# Patient Record
Sex: FEMALE | Race: WHITE | NOT HISPANIC OR LATINO | Employment: UNEMPLOYED | URBAN - METROPOLITAN AREA
[De-identification: names, ages, dates, MRNs, and addresses within clinical notes are randomized per-mention and may not be internally consistent; named-entity substitution may affect disease eponyms.]

---

## 2020-10-29 ENCOUNTER — TELEPHONE (OUTPATIENT)
Dept: CARDIOLOGY | Facility: MEDICAL CENTER | Age: 55
End: 2020-10-29

## 2020-10-29 NOTE — TELEPHONE ENCOUNTER
Called pt to see if she has seen a previous cardiologist in the past, she has not.     She has stated that she has only been to [facility] and that's where all of her records are as well.

## 2020-11-17 DIAGNOSIS — I48.91 ATRIAL FIBRILLATION, UNSPECIFIED TYPE (HCC): ICD-10-CM

## 2020-11-17 DIAGNOSIS — R00.2 PALPITATIONS: ICD-10-CM

## 2022-10-26 ENCOUNTER — APPOINTMENT (OUTPATIENT)
Dept: SLEEP MEDICINE | Facility: MEDICAL CENTER | Age: 57
End: 2022-10-26
Payer: MEDICAID

## 2022-11-08 ENCOUNTER — APPOINTMENT (OUTPATIENT)
Dept: SLEEP MEDICINE | Facility: MEDICAL CENTER | Age: 57
End: 2022-11-08
Payer: MEDICAID

## 2022-12-19 ENCOUNTER — OFFICE VISIT (OUTPATIENT)
Dept: SLEEP MEDICINE | Facility: MEDICAL CENTER | Age: 57
End: 2022-12-19
Payer: MEDICAID

## 2022-12-19 VITALS
RESPIRATION RATE: 18 BRPM | HEIGHT: 62 IN | WEIGHT: 242 LBS | SYSTOLIC BLOOD PRESSURE: 116 MMHG | DIASTOLIC BLOOD PRESSURE: 68 MMHG | BODY MASS INDEX: 44.53 KG/M2 | HEART RATE: 96 BPM | OXYGEN SATURATION: 94 %

## 2022-12-19 DIAGNOSIS — J44.89 COPD WITH ASTHMA (HCC): ICD-10-CM

## 2022-12-19 DIAGNOSIS — Z87.891 FORMER SMOKER: ICD-10-CM

## 2022-12-19 DIAGNOSIS — K51.919 ULCERATIVE COLITIS WITH COMPLICATION, UNSPECIFIED LOCATION (HCC): ICD-10-CM

## 2022-12-19 PROCEDURE — 99204 OFFICE O/P NEW MOD 45 MIN: CPT | Performed by: INTERNAL MEDICINE

## 2022-12-19 RX ORDER — BUDESONIDE AND FORMOTEROL FUMARATE DIHYDRATE 160; 4.5 UG/1; UG/1
AEROSOL RESPIRATORY (INHALATION)
COMMUNITY
Start: 2022-12-01 | End: 2023-01-16

## 2022-12-19 RX ORDER — NALTREXONE HYDROCHLORIDE 50 MG/1
50 TABLET, FILM COATED ORAL
COMMUNITY
Start: 2022-12-01 | End: 2023-03-22

## 2022-12-19 RX ORDER — MONTELUKAST SODIUM 10 MG/1
10 TABLET ORAL EVERY EVENING
Qty: 30 TABLET | Refills: 11 | Status: CANCELLED | OUTPATIENT
Start: 2022-12-19

## 2022-12-19 RX ORDER — TIOTROPIUM BROMIDE 18 UG/1
CAPSULE ORAL; RESPIRATORY (INHALATION)
COMMUNITY
Start: 2022-12-01 | End: 2023-01-16

## 2022-12-19 RX ORDER — ALBUTEROL SULFATE 90 UG/1
AEROSOL, METERED RESPIRATORY (INHALATION)
COMMUNITY
Start: 2022-12-01 | End: 2023-09-28 | Stop reason: SDUPTHER

## 2022-12-19 RX ORDER — FLUTICASONE FUROATE, UMECLIDINIUM BROMIDE AND VILANTEROL TRIFENATATE 200; 62.5; 25 UG/1; UG/1; UG/1
1 POWDER RESPIRATORY (INHALATION) DAILY
Qty: 2 EACH | Refills: 0 | COMMUNITY
Start: 2022-12-19 | End: 2023-01-15 | Stop reason: SDUPTHER

## 2022-12-19 RX ORDER — MONTELUKAST SODIUM 10 MG/1
10 TABLET ORAL EVERY EVENING
Qty: 30 TABLET | Refills: 11 | Status: SHIPPED | OUTPATIENT
Start: 2022-12-19 | End: 2023-12-12

## 2022-12-19 ASSESSMENT — ENCOUNTER SYMPTOMS
STRIDOR: 0
EYE REDNESS: 0
BACK PAIN: 0
PALPITATIONS: 0
WHEEZING: 1
DIZZINESS: 0
EYE PAIN: 0
PND: 0
EYE DISCHARGE: 0
SPEECH CHANGE: 0
NAUSEA: 0
WEIGHT LOSS: 0
ORTHOPNEA: 0
SHORTNESS OF BREATH: 1
DIARRHEA: 0
MYALGIAS: 0
TREMORS: 0
FOCAL WEAKNESS: 0
BLURRED VISION: 0
CHILLS: 0
HEARTBURN: 0
DOUBLE VISION: 0
FEVER: 0
CONSTIPATION: 0
SPUTUM PRODUCTION: 0
SINUS PAIN: 0
CLAUDICATION: 0
VOMITING: 0
HEMOPTYSIS: 0
HEADACHES: 0
NECK PAIN: 0
PHOTOPHOBIA: 0
DEPRESSION: 0
ABDOMINAL PAIN: 0
COUGH: 0
SORE THROAT: 0
DIAPHORESIS: 0
WEAKNESS: 0
FALLS: 0

## 2022-12-19 NOTE — PROGRESS NOTES
Chief Complaint   Patient presents with    Establish Care     Referral Guillermo Ramey Do DX COPD (chronic obstructive pulmonary disease) (HCC)       HPI: This patient is a 57 y.o. female presenting for evaluation of COPD-asthma overlap. PMHx is significant for UC not currently on any therapy and hx of bowel perforation, asthma in her 22 with environmental allergies reportedly worse in [place] than [place]. She is a former smoker with 18-20 pk year hx and quit 6 years ago. She has a hx of methamphetamine use but not for the past 6 years.  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 There is a family hx of asthma but no autoimmune dz or IBD. She was dx with COPD roughly 5 years ago and has been maintained on symbicort 160 and spiriva since that time. She was hospitalized this summer in [place] for acute exacerbation c/b acute hypoxic respiratory failure. She has weaned off O2 but sxs remain poorly controlled in that she is wheezing during our visit today and uses her albuterol 3-4 x daily.  She did not tolerate nebulized Tirso which seemed to worsen her sxs acutely at the time of her hospital stay. She is functionally MMRC class III SOB with minimal activity. Other triggers include strong perfume odors. No pets at home. No recent PFTs. CXR from 2022 read as normal/clear.     History reviewed. No pertinent past medical history.    Social History     Socioeconomic History    Marital status: Single     Spouse name: Not on file    Number of children: Not on file    Years of education: Not on file    Highest education level: Not on file   Occupational History    Not on file   Tobacco Use    Smoking status: Former     Packs/day: 0.50     Years: 37.00     Pack years: 18.50     Types: Cigarettes     Quit date:      Years since quittin.9     Passive exposure: Past    Smokeless tobacco: Never   Vaping Use    Vaping Use: Never used   Substance and Sexual Activity     Alcohol use: Yes     Alcohol/week: 3.6 oz     Types: 6 Standard drinks or equivalent per week    Drug use: Not Currently     Types: Methamphetamines    Sexual activity: Not on file   Other Topics Concern    Not on file   Social History Narrative    Not on file     Social Determinants of Health     Financial Resource Strain: Not on file   Food Insecurity: Not on file   Transportation Needs: Not on file   Physical Activity: Not on file   Stress: Not on file   Social Connections: Not on file   Intimate Partner Violence: Not on file   Housing Stability: Not on file       No family history on file.    Current Outpatient Medications on File Prior to Visit   Medication Sig Dispense Refill    VENTOLIN  (90 Base) MCG/ACT Aero Soln inhalation aerosol INHALE 1 PUFF BY MOUTH EVERY 4 HOURS AS NEEDED FOR WHEEZING      SYMBICORT 160-4.5 MCG/ACT Aerosol USE 2 PUFFS BY MOUTH TWO TIMES A DAY      SPIRIVA HANDIHALER 18 MCG Cap INHALE 1 CAPSULE BY MOUTH EVERY DAY *USE TWO INHALATIONS OF ONE CAPSULE FOR EACH DOSE*      naltrexone (DEPADE) 50 MG Tab Take 50 mg by mouth every day.       No current facility-administered medications on file prior to visit.       Allergies: Aspirin, Doxycycline, and Duloxetine    ROS:   Review of Systems   Constitutional:  Negative for chills, diaphoresis, fever, malaise/fatigue and weight loss.   HENT:  Negative for congestion, ear discharge, ear pain, hearing loss, nosebleeds, sinus pain, sore throat and tinnitus.    Eyes:  Negative for blurred vision, double vision, photophobia, pain, discharge and redness.   Respiratory:  Positive for shortness of breath and wheezing. Negative for cough, hemoptysis, sputum production and stridor.    Cardiovascular:  Negative for chest pain, palpitations, orthopnea, claudication, leg swelling and PND.   Gastrointestinal:  Negative for abdominal pain, constipation, diarrhea, heartburn, nausea and vomiting.   Genitourinary:  Negative for dysuria and urgency.  "  Musculoskeletal:  Negative for back pain, falls, joint pain, myalgias and neck pain.   Skin:  Negative for itching and rash.   Neurological:  Negative for dizziness, tremors, speech change, focal weakness, weakness and headaches.   Endo/Heme/Allergies:  Negative for environmental allergies.   Psychiatric/Behavioral:  Negative for depression.      /68 (BP Location: Right arm, Patient Position: Sitting, BP Cuff Size: Large adult)   Pulse 96   Resp 18   Ht 1.575 m (5' 2\")   Wt 110 kg (242 lb)   SpO2 94%     Physical Exam:  Physical Exam  Constitutional:       General: She is not in acute distress.     Appearance: Normal appearance. She is well-developed and normal weight.   HENT:      Head: Normocephalic and atraumatic.      Right Ear: External ear normal.      Left Ear: External ear normal.      Nose: Nose normal. No congestion.      Mouth/Throat:      Mouth: Mucous membranes are moist.      Pharynx: Oropharynx is clear. No oropharyngeal exudate.   Eyes:      General: No scleral icterus.     Extraocular Movements: Extraocular movements intact.      Conjunctiva/sclera: Conjunctivae normal.      Pupils: Pupils are equal, round, and reactive to light.   Neck:      Vascular: No JVD.      Trachea: No tracheal deviation.   Cardiovascular:      Rate and Rhythm: Normal rate and regular rhythm.      Heart sounds: Normal heart sounds. No murmur heard.    No friction rub. No gallop.   Pulmonary:      Effort: Pulmonary effort is normal. No accessory muscle usage or respiratory distress.      Breath sounds: Normal breath sounds. No wheezing or rales.   Abdominal:      General: There is no distension.      Palpations: Abdomen is soft.      Tenderness: There is no abdominal tenderness.   Musculoskeletal:         General: No tenderness or deformity. Normal range of motion.      Cervical back: Normal range of motion and neck supple.      Right lower leg: No edema.      Left lower leg: No edema.   Lymphadenopathy:      " Cervical: No cervical adenopathy.   Skin:     General: Skin is warm and dry.      Findings: No rash.      Nails: There is no clubbing.   Neurological:      Mental Status: She is alert and oriented to person, place, and time.      Cranial Nerves: No cranial nerve deficit.      Gait: Gait normal.   Psychiatric:         Behavior: Behavior normal.       PFTs as reviewed by me personally: none    Imaging as reviewed by me personally:as per hPI    Assessment:  1. COPD with asthma ([facility])  CBC WITH DIFFERENTIAL    PULMONARY FUNCTION TESTS -Test requested: Complete Pulmonary Function Test    IGE SERUM      2. Ulcerative colitis with complication, unspecified location (HCC)  Referral to Gastroenterology      3. Former smoker            Plan:  Pt appears to have more of an asthma phenotype and I would like to add montelukast and exchange her current regimen to trelegy 200 to see if she gets better medication administration. Continue prn Tirso via MDI, obtain full PFTs, serum IgE and cbc with differential to evaluate for eos. She is tobacco free and up to date on vaccine  Pt may have pulmonary issues related to UC and currently has intermittent UC flairs; no GI f/u therefore will refer to GI  Tobacco free. Medicaid does not cover lung [place] screening  Return in about 4 months (around 4/19/2023) for PFT SAME DAY AS FOLLOW UP; LABS .

## 2022-12-20 ENCOUNTER — HOSPITAL ENCOUNTER (OUTPATIENT)
Dept: LAB | Facility: MEDICAL CENTER | Age: 57
End: 2022-12-20
Attending: INTERNAL MEDICINE
Payer: MEDICAID

## 2022-12-20 DIAGNOSIS — J44.89 COPD WITH ASTHMA (HCC): ICD-10-CM

## 2022-12-20 LAB
BASOPHILS # BLD AUTO: 0.8 % (ref 0–1.8)
BASOPHILS # BLD: 0.07 K/UL (ref 0–0.12)
EOSINOPHIL # BLD AUTO: 0.28 K/UL (ref 0–0.51)
EOSINOPHIL NFR BLD: 3.3 % (ref 0–6.9)
ERYTHROCYTE [DISTWIDTH] IN BLOOD BY AUTOMATED COUNT: 42.8 FL (ref 35.9–50)
HCT VFR BLD AUTO: 39.2 % (ref 37–47)
HGB BLD-MCNC: 12.6 G/DL (ref 12–16)
IMM GRANULOCYTES # BLD AUTO: 0.05 K/UL (ref 0–0.11)
IMM GRANULOCYTES NFR BLD AUTO: 0.6 % (ref 0–0.9)
LYMPHOCYTES # BLD AUTO: 2.45 K/UL (ref 1–4.8)
LYMPHOCYTES NFR BLD: 29.1 % (ref 22–41)
MCH RBC QN AUTO: 30.4 PG (ref 27–33)
MCHC RBC AUTO-ENTMCNC: 32.1 G/DL (ref 33.6–35)
MCV RBC AUTO: 94.5 FL (ref 81.4–97.8)
MONOCYTES # BLD AUTO: 0.76 K/UL (ref 0–0.85)
MONOCYTES NFR BLD AUTO: 9 % (ref 0–13.4)
NEUTROPHILS # BLD AUTO: 4.8 K/UL (ref 2–7.15)
NEUTROPHILS NFR BLD: 57.2 % (ref 44–72)
NRBC # BLD AUTO: 0 K/UL
NRBC BLD-RTO: 0 /100 WBC
PLATELET # BLD AUTO: 382 K/UL (ref 164–446)
PMV BLD AUTO: 9.6 FL (ref 9–12.9)
RBC # BLD AUTO: 4.15 M/UL (ref 4.2–5.4)
WBC # BLD AUTO: 8.4 K/UL (ref 4.8–10.8)

## 2022-12-20 PROCEDURE — 85025 COMPLETE CBC W/AUTO DIFF WBC: CPT

## 2022-12-20 PROCEDURE — 82785 ASSAY OF IGE: CPT

## 2022-12-20 PROCEDURE — 36415 COLL VENOUS BLD VENIPUNCTURE: CPT

## 2022-12-22 LAB — IGE SERPL-ACNC: 6 KU/L

## 2023-01-18 ENCOUNTER — TELEPHONE (OUTPATIENT)
Dept: SLEEP MEDICINE | Facility: MEDICAL CENTER | Age: 58
End: 2023-01-18
Payer: COMMERCIAL

## 2023-03-07 ENCOUNTER — APPOINTMENT (OUTPATIENT)
Dept: SLEEP MEDICINE | Facility: MEDICAL CENTER | Age: 58
End: 2023-03-07
Payer: COMMERCIAL

## 2023-03-21 ASSESSMENT — ENCOUNTER SYMPTOMS: SLEEP DISTURBANCE: 0

## 2023-03-22 ENCOUNTER — OFFICE VISIT (OUTPATIENT)
Dept: SLEEP MEDICINE | Facility: MEDICAL CENTER | Age: 58
End: 2023-03-22
Attending: STUDENT IN AN ORGANIZED HEALTH CARE EDUCATION/TRAINING PROGRAM
Payer: MEDICAID

## 2023-03-22 VITALS
HEIGHT: 63 IN | WEIGHT: 230 LBS | DIASTOLIC BLOOD PRESSURE: 66 MMHG | BODY MASS INDEX: 40.75 KG/M2 | RESPIRATION RATE: 14 BRPM | HEART RATE: 102 BPM | OXYGEN SATURATION: 95 % | SYSTOLIC BLOOD PRESSURE: 108 MMHG

## 2023-03-22 DIAGNOSIS — G47.30 SLEEP DISORDER BREATHING: Primary | ICD-10-CM

## 2023-03-22 DIAGNOSIS — E66.01 CLASS 3 SEVERE OBESITY DUE TO EXCESS CALORIES WITH SERIOUS COMORBIDITY AND BODY MASS INDEX (BMI) OF 40.0 TO 44.9 IN ADULT (HCC): ICD-10-CM

## 2023-03-22 DIAGNOSIS — J44.89 COPD WITH ASTHMA (HCC): ICD-10-CM

## 2023-03-22 PROCEDURE — 99214 OFFICE O/P EST MOD 30 MIN: CPT | Performed by: STUDENT IN AN ORGANIZED HEALTH CARE EDUCATION/TRAINING PROGRAM

## 2023-03-22 PROCEDURE — 99212 OFFICE O/P EST SF 10 MIN: CPT | Performed by: STUDENT IN AN ORGANIZED HEALTH CARE EDUCATION/TRAINING PROGRAM

## 2023-03-22 RX ORDER — OXYBUTYNIN CHLORIDE 10 MG/1
20 TABLET, EXTENDED RELEASE ORAL
COMMUNITY
Start: 2023-02-23

## 2023-03-22 RX ORDER — MECLIZINE HCL 25MG 25 MG/1
TABLET, CHEWABLE ORAL
COMMUNITY
Start: 2023-03-06

## 2023-03-22 RX ORDER — VENLAFAXINE HYDROCHLORIDE 37.5 MG/1
37.5 CAPSULE, EXTENDED RELEASE ORAL
COMMUNITY
Start: 2023-03-20

## 2023-03-22 RX ORDER — ROPINIROLE 1 MG/1
1 TABLET, FILM COATED ORAL EVERY EVENING
COMMUNITY
Start: 2023-02-23

## 2023-03-22 NOTE — PROGRESS NOTES
[facility] Consult Note     Date: 3/22/2023 / Time: 12:39 PM      Thank you for requesting a sleep medicine consultation on Mila Acuña at the sleep center. Presents today with the chief complaints of snoring, daytime fatigue. She is referred by Guillermo Ramey D.O.  [address] [place] [address],  [place] [id number] for evaluation and treatment of sleep disorder breathing and daytime fatigue.     HISTORY OF PRESENT ILLNESS:     Mila Acuña is a 58 y.o. female with COPD, asthma, restless leg syndrome, frequent urination, daytime fatigue and obesity.  Presents to Sleep Clinic for evaluation of sleep.     She follows with pulmonology who is managing her COPD and asthma.  She is on supplemental oxygen 2 L/min as needed.    She states she did undergo a sleep study approximately 4 years ago in [place].  Initially she underwent a home sleep study and then was told to do a in lab sleep study.  She did not follow-up with a in lab sleep study.  She believes that the plan was to do the in lab sleep study on a CPAP and started CPAP at home.  She is not interested at that time.    She does find her sleep nonrestorative.  She is tired at times during the day with low energy.  She does have trouble with falling asleep at night but this does not bother her.  She generally lays awake with the TV on until she nods off at night.  She does stay up late and sleep been daily.  She does occasionally wake with a dry mouth.  She does grind her teeth at night.  She does endorse night sweats related to menopause.  Snoring.     As per supplemental questionnaire to be scanned or imported into chart:    [place] Sleepiness Score: 3    Sleep Schedule  Bedtime: Weekday and weekend 12am to 3am   Wake time: Weekday and weekend 12 to 1pm   Sleep-onset latency: 1hr or more   Awakenings from sleep: 4-6 times a night   Difficulty falling back asleep: 30min   Bedroom partner: yes  Naps: No     DAYTIME SYMPTOMS:   Excessive daytime  "sleepiness: No   Daytime fatigue: Yes  Difficulty concentrating: No   Memory problems: No   Irritability:No   Work/school performance issues: N/A  Sleepiness with driving: No   Caffeine/stimulant use: Yes tea over other day   Alcohol use:No     SLEEP RELATED SYMPTOMS  Snoring: Yes  Witnessed apnea or gasping/choking: No   Dry mouth or mouth breathing: Yes  Sweating: Yes, with menopause   Teeth grinding/biting: Yes  Morning headaches: No   Refreshed Upon Awakening: No      SLEEP RELATED BEHAVIORS:  Parasomnias (walking, talking, eating, violence): No   Leg kicking: No   Restless legs - \"urge to move\": Yes on requip which helps   Nightmares: Yes Recurrent: No   Dream enactment: No      NARCOLEPSY:  Cataplexy: No   Sleep paralysis: No   Sleep attacks: No   Hypnagogic/hypnopompic hallucinations: No     MEDICAL HISTORY  Past Medical History:   Diagnosis Date    Asthma     Back pain     Bronchitis     COPD (chronic obstructive pulmonary disease) ([facility])     Earache     Frequent urination     Pneumonia     Restless leg syndrome     Ringing in ears     Shortness of breath     Wears glasses     Wheezing         SURGICAL HISTORY  Past Surgical History:   Procedure Laterality Date    CHOLECYSTECTOMY      PRIMARY C SECTION          FAMILY HISTORY  Family History   Problem Relation Age of Onset    Asthma Mother     Cancer Mother     Asthma Father     Cancer Father     Asthma Maternal Uncle        SOCIAL HISTORY  Social History     Socioeconomic History    Marital status: Single   Tobacco Use    Smoking status: Former     Packs/day: 0.50     Years: 37.00     Pack years: 18.50     Types: Cigarettes     Quit date: 2016     Years since quittin.2     Passive exposure: Past    Smokeless tobacco: Never   Vaping Use    Vaping Use: Never used   Substance and Sexual Activity    Alcohol use: Not Currently     Alcohol/week: 3.6 oz     Types: 6 Standard drinks or equivalent per week     Comment: I quit drinking 2022    Drug " "use: Not Currently     Types: Methamphetamines        Occupation: homemaker     CURRENT MEDICATIONS  Current Outpatient Medications   Medication Sig Dispense Refill    Meclizine HCl 25 MG Chew Tab TAKE ONE TABLET BY MOUTH 3-TIMES A DAY AS NEEDED FOR NAUSEA & VOMITING      oxybutynin SR (DITROPAN-XL) 10 MG CR tablet Take 20 mg by mouth every day.      ROPINIRole (REQUIP) 1 MG Tab Take 1 mg by mouth every evening.      venlafaxine XR (EFFEXOR XR) 37.5 MG CAPSULE SR 24 HR Take 37.5 mg by mouth every day.      Fluticasone-Umeclidin-Vilant (TRELEGY ELLIPTA) 200-62.5-25 MCG/ACT AEROSOL POWDER, BREATH ACTIVATED Inhale 1 Puff every day. 3 Each 3    VENTOLIN  (90 Base) MCG/ACT Aero Soln inhalation aerosol INHALE 1 PUFF BY MOUTH EVERY 4 HOURS AS NEEDED FOR WHEEZING      montelukast (SINGULAIR) 10 MG Tab Take 1 Tablet by mouth every evening. 30 Tablet 11    naltrexone (DEPADE) 50 MG Tab Take 50 mg by mouth every day.       No current facility-administered medications for this visit.       REVIEW OF SYSTEMS  Constitutional: Denies fevers, Denies weight changes  Ears/Nose/Throat/Mouth: Denies nasal congestion or sore throat   Cardiovascular: Denies chest pain  Respiratory: Chronic shortness of breath, Denies cough  Gastrointestinal/Hepatic: Denies nausea, vomiting  Sleep: see HPI    Physical Examination:  Vitals/ General Appearance:   Weight/BMI: Body mass index is 40.74 kg/m².  /66 (BP Location: Left arm, Patient Position: Sitting, BP Cuff Size: Large adult)   Pulse (!) 102   Resp 14   Ht 1.6 m (5' 3\")   Wt 104 kg (230 lb)   SpO2 95%   Vitals:    03/22/23 1236   BP: 108/66   BP Location: Left arm   Patient Position: Sitting   BP Cuff Size: Large adult   Pulse: (!) 102   Resp: 14   SpO2: 95%   Weight: 104 kg (230 lb)   Height: 1.6 m (5' 3\")       Pt. is alert and oriented to time, place and person. Cooperative and in no apparent distress.     Constitutional: Alert, no distress, well-groomed.  Skin: No rashes in " visible areas.  Eye: Round. Conjunctiva clear, lids normal. No icterus.   ENT EXAM  Nasal alae/valves collapsible: No   Nasal septum deviation: Yes  Nasal turbinate hypertrophy: Left: Grade 1   Right: Grade 1  Hard palate narrow: No   Hard palate high: No   Soft palate/uvula (Mallampati score): 4  Tongue Scalloping: Yes  Retrognathia: No   Micrognathia: No   Cardiovascular:no murmus/gallops/rubs, normal S1 and S2 heart sounds, regular rate and rhythm  Pulmonary:Clear to auscultation, No wheezes, No crackles.  Neurologic:Awake, alert and oriented x 3, Normal age appropriate gait, No involuntary motions.  Extremities: No clubbing, cyanosis, or edema       ASSESSMENT AND PLAN   Mila Acuña is a 58 y.o. female with COPD, asthma, restless leg syndrome, frequent urination, daytime fatigue and obesity.  Presents to Sleep Clinic for evaluation of sleep.     1. Mila Acuña  has symptoms of Obstructive Sleep Apnea (JOSÉ MIGUEL). Mila Acuña has symptoms of snoring,  dry mouth, daytime fatigue, unrefreshed upon awakening. These  interfere with activities of daily living.     Pt has risk factors for JOSÉ MIGUEL include obesity, age, and crowded oropharynx.     The pathophysiology of JOSÉ MIGUEL and the increased risk of cardiovascular morbidity from untreated JOSÉ MIGUEL is discussed in detail with the patient. She  also has COPD, asthma, restless leg syndrome, fatigue which can be worsened by JOSÉ MIGUEL.      We have discussed diagnostic options including in-laboratory, attended polysomnography and home sleep testing. We have also discussed treatment options including airway pressurization, reconstructive otolaryngologic surgery, dental appliances and weight management.     Subsequently,treatment options will be discussed based on the diagnostic study. Meanwhile, She is urged to avoid supine sleep, weight gain and alcoholic beverages since all of these can worsen JOSÉ MIGUEL. She is cautioned against drowsy driving. If She feels sleepy while driving, advised must pull  over for a break/nap, rather than persist on the road, in the interest of Pt's own safety and that of others on the road.    Plan  -  She  will be scheduled for an overnight PSG to assess sleep related breathing disorder.  - Follow up 1-2 weeks after sleep study to discuss results and treatment options moving forward   -Advised to reach out via MyChart or by phone with any questions or concerns.     2.  Regarding treatment of other past medical problems and general health maintenance,  Pt is urged to follow up with PCP.      Please note portions of this record was created using voice recognition software. I have made every reasonable attempt to correct obvious errors, but I expect that there are errors of grammar and possibly content I did not discover before finalizing the note.

## 2023-04-11 ENCOUNTER — SLEEP STUDY (OUTPATIENT)
Dept: SLEEP MEDICINE | Facility: MEDICAL CENTER | Age: 58
End: 2023-04-11
Attending: STUDENT IN AN ORGANIZED HEALTH CARE EDUCATION/TRAINING PROGRAM
Payer: MEDICAID

## 2023-04-11 DIAGNOSIS — J44.89 COPD WITH ASTHMA (HCC): ICD-10-CM

## 2023-04-11 DIAGNOSIS — E66.01 CLASS 3 SEVERE OBESITY DUE TO EXCESS CALORIES WITH SERIOUS COMORBIDITY AND BODY MASS INDEX (BMI) OF 40.0 TO 44.9 IN ADULT (HCC): ICD-10-CM

## 2023-04-11 DIAGNOSIS — G47.30 SLEEP DISORDER BREATHING: ICD-10-CM

## 2023-04-11 PROCEDURE — 95810 POLYSOM 6/> YRS 4/> PARAM: CPT | Performed by: STUDENT IN AN ORGANIZED HEALTH CARE EDUCATION/TRAINING PROGRAM

## 2023-04-13 NOTE — PROCEDURES
Patient: JAMAL CLAIRE  ID: [id number] Date: 4/11/2023   MONTAGE: Standard  STUDY TYPE: Diagnostic    RECORDING TECHNIQUE:   After the scalp was prepared, gold plated electrodes were applied to the scalp according to the International 10-20 System. EEG (electroencephalogram) was continuously monitored from the O1-M2, O2-M1, C3-M2, C4-M1, F3-M2, and F4-M1. EOGs (electrooculograms) were monitored by electrodes placed at the left and right outer canthi. Chin EMG (electromyogram) was monitored by electrodes placed on the mentalis and sub-mentalis muscles. Nasal and oral airflow were monitored using a triple port thermocouple as well as oronasal pressure transducer. Respiratory effort was measured by inductive plethysmography technology employing abdominal and thoracic belts. Blood oxygen saturation and pulse were monitored by pulse oximetry. Heart rhythm was monitored by surface electrocardiogram. Leg EMG was studied using surface electrodes placed on left and right anterior tibialis. A microphone was used to monitor tracheal sounds and snoring. Body position was monitored and documented by technician observation.   SCORING CRITERIA:   A modification of the AASM manual for scoring of sleep and associated events was used. Obstructive apneas were scored by cessation of airflow for at least 10 seconds with continuing respiratory effort. Central apneas were scored by cessation of airflow for at least 10 seconds with no respiratory effort. Hypopneas were scored by a 30% or more reduction in airflow for at least 10 seconds accompanied by arterial oxygen desaturation of 3% or an arousal. For CMS (Medicare) patients, per AASM rule 1B, hypopneas are scored by 30% with mild reduction in airflow for at least 10 seconds accompanied by arterial saturation decreased at 4%.    Study start time was 10:36:39 PM. Diagnostic recording time was 7h 36.0m with a total sleep time of 6h 15.5m resulting in a sleep efficiency of 82.35%%. Sleep  latency from the start of the study was 10 minutes and the latency from sleep to REM was 83 minutes. In total,74 arousals were scored for an arousal index of 11.8.  Respiratory:  There were a total of 9 apneas consisting of 4 obstructive apneas, 0 mixed apneas, and 5 central apneas. A total of 71 hypopneas were scored. The apnea index was 1.44 per hour and the hypopnea index was 11.34 per hour resulting in an overall AHI of 12.78. AHI during REM was 36.3 and AHI while supine was 37.28.  Oximetry:  There was a mean oxygen saturation of 85.0%. The minimum oxygen saturation in NREM was 76.0 % and in REM was 67.0%. The patient spent 370.6 minutes of TST with SaO2 <88%.  Cardiac:  The highest heart rate seen while awake was 112 BPM while the highest heart rate during sleep was 121 BPM with an average sleeping heart rate of 87 BPM.  Limb Movements:  There were a total of 24 PLMs during sleep which resulted in a PLMS index of 3.8. Of these, 35 were associated with arousals which resulted in a PLMS arousal index of 5.6.      Impression:  1.  Mild obstructive sleep apnea with an overall AHI of 12.8 events an hour  2.  Severe nocturnal hypoxia with minimum oxygen saturation of 67%, time at or below 88% saturation of 371 minutes  3.  Respiratory events regarding sleep apnea were worse during REM sleep and supine sleep  4.  Level of nocturnal hypoxia out of proportion to level of sleep apnea.    Recommendations:  I recommend the patient return for a CPAP/BiPAP titration due to the severity of sleep-related hypoxia.     In some cases alternative treatment options may be proven effective in resolving sleep apnea. These options include upper airway surgery, the use of a dental orthotic, weight loss, or positional therapy. Clinical correlation is required. In general patients with sleep apnea are advised to avoid alcohol, sedatives and not to operate a motor vehicle while drowsy.  Untreated sleep apnea increases the risk for  cardiovascular and neurovascular disease.

## 2023-05-25 ENCOUNTER — OFFICE VISIT (OUTPATIENT)
Dept: SLEEP MEDICINE | Facility: MEDICAL CENTER | Age: 58
End: 2023-05-25
Attending: STUDENT IN AN ORGANIZED HEALTH CARE EDUCATION/TRAINING PROGRAM
Payer: COMMERCIAL

## 2023-05-25 VITALS
HEART RATE: 94 BPM | BODY MASS INDEX: 38.98 KG/M2 | SYSTOLIC BLOOD PRESSURE: 118 MMHG | OXYGEN SATURATION: 93 % | WEIGHT: 220 LBS | DIASTOLIC BLOOD PRESSURE: 70 MMHG | RESPIRATION RATE: 16 BRPM | HEIGHT: 63 IN

## 2023-05-25 DIAGNOSIS — J44.89 COPD WITH ASTHMA (HCC): ICD-10-CM

## 2023-05-25 DIAGNOSIS — G47.33 OSA (OBSTRUCTIVE SLEEP APNEA): Primary | ICD-10-CM

## 2023-05-25 DIAGNOSIS — E66.01 CLASS 3 SEVERE OBESITY DUE TO EXCESS CALORIES WITH SERIOUS COMORBIDITY AND BODY MASS INDEX (BMI) OF 40.0 TO 44.9 IN ADULT (HCC): ICD-10-CM

## 2023-05-25 DIAGNOSIS — G47.34 NOCTURNAL HYPOXIA: ICD-10-CM

## 2023-05-25 PROCEDURE — 99213 OFFICE O/P EST LOW 20 MIN: CPT | Performed by: STUDENT IN AN ORGANIZED HEALTH CARE EDUCATION/TRAINING PROGRAM

## 2023-05-25 PROCEDURE — 99212 OFFICE O/P EST SF 10 MIN: CPT | Performed by: STUDENT IN AN ORGANIZED HEALTH CARE EDUCATION/TRAINING PROGRAM

## 2023-05-25 PROCEDURE — 3074F SYST BP LT 130 MM HG: CPT | Performed by: STUDENT IN AN ORGANIZED HEALTH CARE EDUCATION/TRAINING PROGRAM

## 2023-05-25 PROCEDURE — 3078F DIAST BP <80 MM HG: CPT | Performed by: STUDENT IN AN ORGANIZED HEALTH CARE EDUCATION/TRAINING PROGRAM

## 2023-05-25 NOTE — PROGRESS NOTES
[facility] Follow-up Visit    CC: Follow-up to discuss sleep study results      HPI:  Mila Acuña is a 58 y.o.female  with asthma, COPD, restless leg syndrome, frequent urination, daytime fatigue, obesity and obstructive sleep apnea with significant nocturnal hypoxia.  Presents today to discuss sleep study results.    She continues to follow with pulmonology who is managing her COPD and asthma.  She is continuing to use supplemental oxygen 2 L/min as needed.    She reports night sleep study went very well.  She states she slept one of the best nights of sleep in the sleep lab which she attributes due to the bed.  Overall she is very happy with her sleep the night of the study.  She did review the study results prior to today's visit.    She continues to complain of being tired with very low energy during the day.  She continues to have trouble with her sleep being nonrestorative.      Sleep History  4/11/2023 PSG showed mild obstructive sleep apnea with an overall AHI 12.78.  Minimum oxygen saturation of 67%, time at or below 88% saturation of 370.6 minutes.      There are no problems to display for this patient.      Past Medical History:   Diagnosis Date    Asthma     Back pain     Bronchitis     COPD (chronic obstructive pulmonary disease) (HCC)     Earache     Frequent urination     Pneumonia     Restless leg syndrome     Ringing in ears     Shortness of breath     Wears glasses     Wheezing         Past Surgical History:   Procedure Laterality Date    CHOLECYSTECTOMY      PRIMARY C SECTION         Family History   Problem Relation Age of Onset    Asthma Mother     Cancer Mother     Asthma Father     Cancer Father     Asthma Maternal Uncle        Social History     Socioeconomic History    Marital status: Single     Spouse name: Not on file    Number of children: Not on file    Years of education: Not on file    Highest education level: Not on file   Occupational History    Not on file   Tobacco Use     Smoking status: Former     Packs/day: 0.50     Years: 37.00     Pack years: 18.50     Types: Cigarettes     Quit date: 2016     Years since quittin.4     Passive exposure: Past    Smokeless tobacco: Never   Vaping Use    Vaping Use: Never used   Substance and Sexual Activity    Alcohol use: Not Currently     Alcohol/week: 3.6 oz     Types: 6 Standard drinks or equivalent per week     Comment: I quit drinking 2022    Drug use: Not Currently     Types: Methamphetamines    Sexual activity: Not on file   Other Topics Concern    Not on file   Social History Narrative    Not on file     Social Determinants of Health     Financial Resource Strain: Not on file   Food Insecurity: Not on file   Transportation Needs: Not on file   Physical Activity: Not on file   Stress: Not on file   Social Connections: Not on file   Intimate Partner Violence: Not on file   Housing Stability: Not on file       Current Outpatient Medications   Medication Sig Dispense Refill    Meclizine HCl 25 MG Chew Tab TAKE ONE TABLET BY MOUTH 3-TIMES A DAY AS NEEDED FOR NAUSEA & VOMITING      oxybutynin SR (DITROPAN-XL) 10 MG CR tablet Take 20 mg by mouth every day.      ROPINIRole (REQUIP) 1 MG Tab Take 1 mg by mouth every evening.      venlafaxine XR (EFFEXOR XR) 37.5 MG CAPSULE SR 24 HR Take 37.5 mg by mouth every day.      Fluticasone-Umeclidin-Vilant (TRELEGY ELLIPTA) 200-62.5-25 MCG/ACT AEROSOL POWDER, BREATH ACTIVATED Inhale 1 Puff every day. 3 Each 3    VENTOLIN  (90 Base) MCG/ACT Aero Soln inhalation aerosol INHALE 1 PUFF BY MOUTH EVERY 4 HOURS AS NEEDED FOR WHEEZING      montelukast (SINGULAIR) 10 MG Tab Take 1 Tablet by mouth every evening. 30 Tablet 11     No current facility-administered medications for this visit.        ALLERGIES: Aspirin, Doxycycline, and Duloxetine    ROS  Constitutional: Denies fevers, Denies weight changes  Ears/Nose/Throat/Mouth: Denies nasal congestion or sore throat   Cardiovascular: Denies  "chest pain  Respiratory: Denies shortness of breath, Denies cough  Gastrointestinal/Hepatic: Denies nausea, vomiting  Sleep: see HPI      PHYSICAL EXAM  /70 (BP Location: Left arm, Patient Position: Sitting, BP Cuff Size: Large adult)   Pulse 94   Resp 16   Ht 1.6 m (5' 3\")   Wt 99.8 kg (220 lb)   SpO2 93%   BMI 38.97 kg/m²   Appearance: Well-nourished, well-developed, no acute distress  Eyes:  No scleral icterus , EOMI  Musculoskeletal:  Grossly normal; gait and station normal; digits and nails normal  Skin:  No rashes, petechiae, cyanosis  Neurologic: without focal signs; oriented to person, time, place, and purpose; judgement intact      Medical Decision Making   Assessment and Plan  Mial Acuña is a 58 y.o.female  with asthma, COPD, restless leg syndrome, frequent urination, daytime fatigue, obesity and obstructive sleep apnea with significant nocturnal hypoxia.  Presents today to discuss sleep study results.    The medical record was reviewed.    Obstructive sleep apnea   Reviewed recent PSG with patient showing an AHI of 12.78,  and Min Oxygen saturation of 67%.  Time at or below 88% saturation 370.6 minutes  Based on sleep study and symptoms meets criteria for mild obstructive sleep apnea.   Nocturnal hypoxia is out of proportion to level of sleep apnea.  Based off of this she would benefit from undergoing a PSG titration study to see if CPAP is enough or patient would benefit more from BiPAP therapy.  We discussed the pathophysiology of obstructive sleep apnea (JOSÉ MIGUEL) and risk factors for the disease. We also discussed possible consequences of untreated JOSÉ MIGUEL, including excessive daytime sleepiness and fatigue, cognitive dysfunction, cardiovascular complications such as elevated blood pressure, heart attacks, cardiac arrhythmias, and strokes. We discussed how JOSÉ MIGUEL typically gets worse with age. We discussed treatment options for JOSÉ MIGUEL, including the gold standard therapy (PAP), alternative options such " as a mandibular advancement device (custom-made oral appliances) and surgeries.     We will proceed with PSG titration study given severity of nocturnal hypoxia in the setting of mild obstructive sleep apnea    RECOMMENDATIONS  -She will be scheduled for PSG titration study  -Patient counseled to avoid driving when sleepy. Encouraged to anticipate sleepiness, consider taking a 10 min nap prior to driving, alternate with another , or pull over if sleepy while driving  -Advised to contact our office or myself with any questions via [employer]    Have advised the patient to follow up with the appropriate healthcare practitioners for all other medical problems and issues.    Return for after sleep study.      Please note portions of this record was created using voice recognition software. I have made every reasonable attempt to correct obvious errors, but I expect that there are errors of grammar and possibly content I did not discover before finalizing the note.

## 2023-06-02 ENCOUNTER — TELEPHONE (OUTPATIENT)
Dept: SLEEP MEDICINE | Facility: MEDICAL CENTER | Age: 58
End: 2023-06-02

## 2023-06-02 NOTE — TELEPHONE ENCOUNTER
LVM FOR THE PT WANTING TO MOVE APT TO ANOTHER DUE TO ANOTHER INSPIRE SS ON THE SAME NIGHT. INFORMED PT OFTHIS AND ADVISE HER TO GIVE ME A CALL BACK TO R/S APT AND I ALSO APOLOGIZE TO THE PT FOR THIS

## 2023-06-04 ENCOUNTER — SLEEP STUDY (OUTPATIENT)
Dept: SLEEP MEDICINE | Facility: MEDICAL CENTER | Age: 58
End: 2023-06-04
Attending: STUDENT IN AN ORGANIZED HEALTH CARE EDUCATION/TRAINING PROGRAM
Payer: COMMERCIAL

## 2023-06-04 DIAGNOSIS — E66.01 CLASS 3 SEVERE OBESITY DUE TO EXCESS CALORIES WITH SERIOUS COMORBIDITY AND BODY MASS INDEX (BMI) OF 40.0 TO 44.9 IN ADULT (HCC): ICD-10-CM

## 2023-06-04 DIAGNOSIS — J44.89 COPD WITH ASTHMA (HCC): ICD-10-CM

## 2023-06-04 DIAGNOSIS — G47.34 NOCTURNAL HYPOXIA: ICD-10-CM

## 2023-06-04 DIAGNOSIS — G47.33 OSA (OBSTRUCTIVE SLEEP APNEA): ICD-10-CM

## 2023-06-04 PROCEDURE — 95811 POLYSOM 6/>YRS CPAP 4/> PARM: CPT | Performed by: STUDENT IN AN ORGANIZED HEALTH CARE EDUCATION/TRAINING PROGRAM

## 2023-06-05 NOTE — PROCEDURES
Physician: NORBERTO PENA  Patient: JAMAL CLAIRE  ID: [id number] Date: 6/4/2023   MONTAGE: Standard  STUDY TYPE: Treatment  RECORDING TECHNIQUE:   After the scalp was prepared, gold plated electrodes were applied to the scalp according to the International 10-20 System. EEG (electroencephalogram) was continuously monitored from the O1-M2, O2-M1, C3-M2, C4-M1, F3-M2, and F4-M1. EOGs (electrooculograms) were monitored by electrodes placed at the left and right outer canthi. Chin EMG (electromyogram) was monitored by electrodes placed on the mentalis and sub-mentalis muscles. Nasal and oral airflow were monitored using a triple port thermocouple as well as oronasal pressure transducer. Respiratory effort was measured by inductive plethysmography technology employing abdominal and thoracic belts. Blood oxygen saturation and pulse were monitored by pulse oximetry. Heart rhythm was monitored by surface electrocardiogram. Leg EMG was studied using surface electrodes placed on left and right anterior tibialis. A microphone was used to monitor tracheal sounds and snoring. Body position was monitored and documented by technician observation.   SCORING CRITERIA:   A modification of the AASM manual for scoring of sleep and associated events was used. Obstructive apneas were scored by cessation of airflow for at least 10 seconds with continuing respiratory effort. Central apneas were scored by cessation of airflow for at least 10 seconds with no respiratory effort. Hypopneas were scored by a 30% or more reduction in airflow for at least 10 seconds accompanied by arterial oxygen desaturation of 3% or an arousal. For CMS (Medicare) patients, per AASM rule 1B, hypopneas are scored by 30% with mild reduction in airflow for at least 10 seconds accompanied by arterial saturation decreased at 4%.  Study start time was 10:16:08 PM. Diagnostic recording time was 7h 13.5m with a total sleep time of 5h 58.0m resulting in a sleep efficiency  of 82.58%%. Sleep latency from the start of the study was 34 minutes and the latency from sleep to REM was 343 minutes. In total,41 arousals were scored for an arousal index of 6.9.  Respiratory:  There were a total of 3 apneas consisting of 0 obstructive apneas, 0 mixed apneas, and 3 central apneas. A total of 27 hypopneas were scored. The apnea index was 0.50 per hour and the hypopnea index was 4.53 per hour resulting in an overall AHI of 5.03. AHI during REM was 18.9 and AHI while supine was 1.07.  Oximetry:  There was a mean oxygen saturation of 87.0%. The minimum oxygen saturation in NREM was 82.0 % and in REM was 85.0%. The patient spent 300.5 minutes of TST with SaO2 <88%.  Cardiac:  The highest heart rate seen while awake was 97 BPM while the highest heart rate during sleep was 93 BPM with an average sleeping heart rate of 81 BPM.  Limb Movements:  There were a total of 11 PLMs during sleep which resulted in a PLMS index of 1.8. Of these, 4 were associated with arousals which resulted in a PLMS arousal index of 0.7.  Titration:   CPAP was tried from 5cm to 8cm H2O. BiPAP was tried from 12/8 to 14/10cm H2O.   This was a fully attended sleep study. This test was technically adequate. This patient was titrated on CPAP starting at 5 cm of water pressure. Patient was titrated up to BiPAP 14/10 cm of water pressure. Patient did best at BiPAP 14/10 cm of water pressure. Patient spent 17 minutes at that pressure and the AHI was 4.6 which is considered Treated obstructive sleep apnea.     Impression:  1.  Study was done on CPAP and BiPAP  2.  Respiratory events improved with CPAP but oxygen saturations continue to be low  3.  BiPAP was added towards the end of study and oxygen saturations appear to improve  4.  Nocturnal hypoxia with time at or below 88% saturation of 300 minutes  5.  No supine REM sleep was seen during night of study    Recommendations:  I recommend auto BiPAP of EPAP 8 IPAP 16 cm pressure support 4  with supplemental oxygen 2L/min.  Patient used a medium Frederick mask during night of study.  Once stable on BiPAP would recommend OPO with BiPAP only.    I also recommend 30 day compliance download to assess the efficacy to the recommended pressure, measure leak, apnea hypopnea index and compliance for further outpatient monitoring and management of PAP therapy. In some cases alternative treatment options may be proven effective in resolving sleep apnea. These options include upper airway surgery, the use of a dental orthotic, weight loss, or positional therapy. Clinical correlation is required. In general patients with sleep apnea are advised to avoid alcohol, sedatives and not to operate a motor vehicle while drowsy.  Untreated sleep apnea increases the risk for cardiovascular and neurovascular disease.

## 2023-06-14 ENCOUNTER — TELEPHONE (OUTPATIENT)
Dept: SLEEP MEDICINE | Facility: MEDICAL CENTER | Age: 58
End: 2023-06-14

## 2023-06-14 NOTE — TELEPHONE ENCOUNTER
Rosemarie from [facility] did call regarding mutual patient for Oxygen re-certification, patient was supposed to have a Sleep Study done to determine oxygen re-certification, Would like a call back [phone].    In the appointments tab it does state we did cancel appointment due to 2 Inspires on the same night.

## 2023-06-19 ENCOUNTER — TELEPHONE (OUTPATIENT)
Dept: SLEEP MEDICINE | Facility: MEDICAL CENTER | Age: 58
End: 2023-06-19

## 2023-06-19 NOTE — TELEPHONE ENCOUNTER
Rosemarie from [facility] did call is questioning on the order for Oxygen re-certification 24 hr usage patient is currently on, they are needing Rx signed, chart notes, and 02 sat within the last 30 days, she will fax over the Rx today, good call back number is [phone].

## 2023-07-27 ENCOUNTER — APPOINTMENT (OUTPATIENT)
Dept: SLEEP MEDICINE | Facility: MEDICAL CENTER | Age: 58
End: 2023-07-27
Attending: STUDENT IN AN ORGANIZED HEALTH CARE EDUCATION/TRAINING PROGRAM
Payer: COMMERCIAL

## 2023-09-28 ENCOUNTER — OFFICE VISIT (OUTPATIENT)
Dept: SLEEP MEDICINE | Facility: MEDICAL CENTER | Age: 58
End: 2023-09-28
Attending: INTERNAL MEDICINE
Payer: COMMERCIAL

## 2023-09-28 VITALS
HEART RATE: 90 BPM | WEIGHT: 230 LBS | DIASTOLIC BLOOD PRESSURE: 66 MMHG | BODY MASS INDEX: 40.75 KG/M2 | SYSTOLIC BLOOD PRESSURE: 106 MMHG | OXYGEN SATURATION: 97 % | HEIGHT: 63 IN

## 2023-09-28 VITALS — WEIGHT: 230 LBS | HEIGHT: 63 IN | BODY MASS INDEX: 40.75 KG/M2

## 2023-09-28 DIAGNOSIS — G47.33 OSA (OBSTRUCTIVE SLEEP APNEA): ICD-10-CM

## 2023-09-28 DIAGNOSIS — J44.89 COPD WITH ASTHMA (HCC): ICD-10-CM

## 2023-09-28 DIAGNOSIS — Z87.891 FORMER SMOKER: ICD-10-CM

## 2023-09-28 PROCEDURE — 94060 EVALUATION OF WHEEZING: CPT | Performed by: INTERNAL MEDICINE

## 2023-09-28 PROCEDURE — 99214 OFFICE O/P EST MOD 30 MIN: CPT | Performed by: INTERNAL MEDICINE

## 2023-09-28 PROCEDURE — 94729 DIFFUSING CAPACITY: CPT | Performed by: INTERNAL MEDICINE

## 2023-09-28 PROCEDURE — 99212 OFFICE O/P EST SF 10 MIN: CPT | Performed by: INTERNAL MEDICINE

## 2023-09-28 PROCEDURE — 94726 PLETHYSMOGRAPHY LUNG VOLUMES: CPT | Mod: 26 | Performed by: INTERNAL MEDICINE

## 2023-09-28 PROCEDURE — 94729 DIFFUSING CAPACITY: CPT | Mod: 26 | Performed by: INTERNAL MEDICINE

## 2023-09-28 PROCEDURE — 94726 PLETHYSMOGRAPHY LUNG VOLUMES: CPT | Performed by: INTERNAL MEDICINE

## 2023-09-28 PROCEDURE — 3074F SYST BP LT 130 MM HG: CPT | Performed by: INTERNAL MEDICINE

## 2023-09-28 PROCEDURE — 3078F DIAST BP <80 MM HG: CPT | Performed by: INTERNAL MEDICINE

## 2023-09-28 PROCEDURE — 94060 EVALUATION OF WHEEZING: CPT | Mod: 26 | Performed by: INTERNAL MEDICINE

## 2023-09-28 RX ORDER — ALBUTEROL SULFATE 90 UG/1
AEROSOL, METERED RESPIRATORY (INHALATION)
Qty: 8.5 G | Refills: 6 | Status: SHIPPED | OUTPATIENT
Start: 2023-09-28

## 2023-09-28 ASSESSMENT — PULMONARY FUNCTION TESTS
FEV1_PERCENT_PREDICTED: 56
FVC: 2.19
FEV1: 1.41
FEV1_PERCENT_PREDICTED: 51
FEV1/FVC_PERCENT_LLN: 67
FVC_PERCENT_PREDICTED: 69
FEV1_PERCENT_CHANGE: 4
FEV1_PREDICTED: 2.51
FEV1/FVC_PERCENT_PREDICTED: 76
FVC_PERCENT_PREDICTED: 66
FEV1/FVC_PERCENT_CHANGE: 200
FEV1/FVC_PERCENT_PREDICTED: 77
FEV1/FVC_PERCENT_PREDICTED: 80
FEV1/FVC_PERCENT_PREDICTED: 81
FVC: 2.11
FEV1_PERCENT_CHANGE: 2
FEV1/FVC_PERCENT_CHANGE: 4
FEV1/FVC: 61
FVC_LLN: 2.63
FEV1/FVC: 64.38
FEV1/FVC: 64
FEV1/FVC_PERCENT_PREDICTED: 79
FEV1: 1.3
FEV1/FVC: 62
FVC_PREDICTED: 3.16
FEV1_LLN: 2.09
FEV1/FVC_PREDICTED: 80

## 2023-09-28 ASSESSMENT — ENCOUNTER SYMPTOMS
HEARTBURN: 0
HEMOPTYSIS: 0
TREMORS: 0
SPEECH CHANGE: 0
PALPITATIONS: 0
BLURRED VISION: 0
WEIGHT LOSS: 0
STRIDOR: 0
PHOTOPHOBIA: 0
EYE REDNESS: 0
DIAPHORESIS: 0
EYE DISCHARGE: 0
SHORTNESS OF BREATH: 0
DEPRESSION: 0
SINUS PAIN: 0
MYALGIAS: 0
CONSTIPATION: 0
FEVER: 0
CLAUDICATION: 0
FALLS: 0
FOCAL WEAKNESS: 0
DOUBLE VISION: 0
NECK PAIN: 0
COUGH: 0
SPUTUM PRODUCTION: 0
CHILLS: 0
DIARRHEA: 0
BACK PAIN: 0
VOMITING: 0
ORTHOPNEA: 0
PND: 0
EYE PAIN: 0
DIZZINESS: 0
HEADACHES: 0
ABDOMINAL PAIN: 0
WEAKNESS: 0
NAUSEA: 0
SORE THROAT: 0
WHEEZING: 0

## 2023-09-28 NOTE — PROCEDURES
Technician: CONCEPCION Shine    Technician Comment:  Good patient effort & cooperation.  The results of this test meet the ATS/ERS standards for acceptability & reproducibility.  Test was performed on the [employer] Body Plethysmograph-Elite DX system.  Predicted values were GLI-2012 for spirometry, GLI-2020 for DLCO, ITS for Lung Volumes.  The DLCO was uncorrected for Hgb.  A bronchodilator of Albuterol HFA -2puffs via spacer administered.  DLCO performed during dilation period.    Interpretation:   Baseline spirometry shows airflow obstruction with an FEV1/FVC ratio 61 and FEV1 of 1.2 L or 51% predicted.  There is trend toward bronchodilator responsiveness with improvement in FEV1 to 1.41 L or 56% predicted however this does not meet ATS criteria.  Total capacity is within normal limits at 5.4 L or 110% predicted.  There is air trapping with residual volume of 169% predicted.  Diffusion capacity is mildly reduced at 77% predicted.  Function testing shows airflow obstruction with air trapping and reduced DLCO consistent with chronic obstructive pulmonary disease.

## 2023-09-28 NOTE — PROGRESS NOTES
Chief Complaint   Patient presents with    Follow-Up     LAST SEEN 12/19/22    Results      PFT 9/18/23, LABS 12/20/22         HPI: This patient is a 58 y.o. female whom is followed in our clinic for COPD last seen by me on 12/19/22 to establish care. PMHx is significant for UC not currently on any therapy and hx of bowel perforation, asthma in her 22 with environmental allergies reportedly worse in [place] than [place]. She is a former smoker with 18-20 pk year hx and quit 7 years ago. She has a hx of methamphetamine use but not for the past 7 years. There is a family hx of asthma but no autoimmune dz or IBD. She was dx with COPD roughly 6 years ago and was on Symbicort and Spiriva at the time of my initial visit with her.  She was hospitalized in [place] last year for acute hypoxic respiratory failure and weaned off supplemental oxygen but had poor control of wheezing and shortness of breath at the time of our first visit using albuterol 3-4 times daily.  We transitioned her to Trelegy 200 and she has not had need for prednisone, urgent care visit or hospitalization for respiratory issues since then.  Pulmonary function testing done today confirms airflow obstruction with an FEV1/FVC ratio 61 and FEV1 postbronchodilator 1.41 L or 56% predicted.  She does not meet bronchodilator responsiveness by ATS criteria however she has significant response in the mid flows.  Total lung capacity is normal but she does have some air trapping and DLCO is just below lower limits of normal at 77% predicted.  Since her last visit she is also establish care with sleep medicine and is on CPAP therapy.    Past Medical History:   Diagnosis Date    Asthma     Back pain     Bronchitis     COPD (chronic obstructive pulmonary disease) (HCC)     Earache     Frequent urination     Pneumonia     Restless leg syndrome     Ringing in ears     Shortness of breath     Wears glasses     Wheezing        Social History     Socioeconomic History    Marital  status: Single     Spouse name: Not on file    Number of children: Not on file    Years of education: Not on file    Highest education level: Not on file   Occupational History    Not on file   Tobacco Use    Smoking status: Former     Current packs/day: 0.00     Average packs/day: 0.5 packs/day for 37.0 years (18.5 ttl pk-yrs)     Types: Cigarettes     Start date: 1979     Quit date: 2016     Years since quittin.7     Passive exposure: Past    Smokeless tobacco: Never   Vaping Use    Vaping Use: Never used   Substance and Sexual Activity    Alcohol use: Not Currently     Alcohol/week: 3.6 oz     Types: 6 Standard drinks or equivalent per week     Comment: I quit drinking 2022    Drug use: Not Currently     Types: Methamphetamines    Sexual activity: Not on file   Other Topics Concern    Not on file   Social History Narrative    Not on file     Social Determinants of Health     Financial Resource Strain: Not on file   Food Insecurity: Not on file   Transportation Needs: Not on file   Physical Activity: Not on file   Stress: Not on file   Social Connections: Not on file   Intimate Partner Violence: Not on file   Housing Stability: Not on file       Family History   Problem Relation Age of Onset    Asthma Mother     Cancer Mother     Asthma Father     Cancer Father     Asthma Maternal Uncle        Current Outpatient Medications on File Prior to Visit   Medication Sig Dispense Refill    Fluticasone-Umeclidin-Vilant (TRELEGY ELLIPTA) 200-62.5-25 MCG/ACT AEROSOL POWDER, BREATH ACTIVATED Inhale 1 Puff every day. 3 Each 3    montelukast (SINGULAIR) 10 MG Tab Take 1 Tablet by mouth every evening. 30 Tablet 11    Meclizine HCl 25 MG Chew Tab TAKE ONE TABLET BY MOUTH 3-TIMES A DAY AS NEEDED FOR NAUSEA & VOMITING      oxybutynin SR (DITROPAN-XL) 10 MG CR tablet Take 20 mg by mouth every day.      ROPINIRole (REQUIP) 1 MG Tab Take 1 mg by mouth every evening.      venlafaxine XR (EFFEXOR XR) 37.5 MG  "CAPSULE SR 24 HR Take 37.5 mg by mouth every day.       No current facility-administered medications on file prior to visit.       Aspirin, Doxycycline, and Duloxetine      ROS:   Review of Systems   Constitutional:  Negative for chills, diaphoresis, fever, malaise/fatigue and weight loss.   HENT:  Negative for congestion, ear discharge, ear pain, hearing loss, nosebleeds, sinus pain, sore throat and tinnitus.    Eyes:  Negative for blurred vision, double vision, photophobia, pain, discharge and redness.   Respiratory:  Negative for cough, hemoptysis, sputum production, shortness of breath, wheezing and stridor.    Cardiovascular:  Negative for chest pain, palpitations, orthopnea, claudication, leg swelling and PND.   Gastrointestinal:  Negative for abdominal pain, constipation, diarrhea, heartburn, nausea and vomiting.   Genitourinary:  Negative for dysuria and urgency.   Musculoskeletal:  Negative for back pain, falls, joint pain, myalgias and neck pain.   Skin:  Negative for itching and rash.   Neurological:  Negative for dizziness, tremors, speech change, focal weakness, weakness and headaches.   Endo/Heme/Allergies:  Negative for environmental allergies.   Psychiatric/Behavioral:  Negative for depression.        /66 (BP Location: Right arm, Patient Position: Sitting, BP Cuff Size: Adult)   Pulse 90   Ht 1.6 m (5' 3\")   Wt 104 kg (230 lb)   SpO2 97%   Physical Exam  Constitutional:       General: She is not in acute distress.     Appearance: Normal appearance. She is well-developed and normal weight.   HENT:      Head: Normocephalic and atraumatic.      Right Ear: External ear normal.      Left Ear: External ear normal.      Nose: Nose normal. No congestion.      Mouth/Throat:      Mouth: Mucous membranes are moist.      Pharynx: Oropharynx is clear. No oropharyngeal exudate.   Eyes:      General: No scleral icterus.     Extraocular Movements: Extraocular movements intact.      Conjunctiva/sclera: " Conjunctivae normal.      Pupils: Pupils are equal, round, and reactive to light.   Neck:      Vascular: No JVD.      Trachea: No tracheal deviation.   Cardiovascular:      Rate and Rhythm: Normal rate and regular rhythm.      Heart sounds: Normal heart sounds. No murmur heard.     No friction rub. No gallop.   Pulmonary:      Effort: Pulmonary effort is normal. No accessory muscle usage or respiratory distress.      Breath sounds: Normal breath sounds. No wheezing or rales.   Abdominal:      General: There is no distension.      Palpations: Abdomen is soft.      Tenderness: There is no abdominal tenderness.   Musculoskeletal:         General: No tenderness or deformity. Normal range of motion.      Cervical back: Normal range of motion and neck supple.      Right lower leg: No edema.      Left lower leg: No edema.   Lymphadenopathy:      Cervical: No cervical adenopathy.   Skin:     General: Skin is warm and dry.      Findings: No rash.      Nails: There is no clubbing.   Neurological:      Mental Status: She is alert and oriented to person, place, and time.      Cranial Nerves: No cranial nerve deficit.      Gait: Gait normal.   Psychiatric:         Behavior: Behavior normal.         PFTs as reviewed by me personally: As per HPI    Imagaing as reviewed by me personally: None    Assessment:  1. JOSÉ MIGUEL (obstructive sleep apnea)  DME Other      2. COPD with asthma (HCC)  VENTOLIN  (90 Base) MCG/ACT Aero Soln inhalation aerosol      3. Former smoker            Plan:  Patient is followed by sleep medicine.  I did write an order for mask fitting at the patient's request.  Chronic and his responded excellently to Trelegy 200.  She is tobacco free.  No exacerbations since her last clinic visit.  I would like to see her back in 6 months.  If symptoms remain well controlled, we can titrate ICS dose down.  Tobacco free.  She does qualify for lung cancer screening which we can discuss referral to at follow-up.  Return in  about 6 months (around 3/28/2024) for copd-asthma .

## 2023-09-29 ENCOUNTER — TELEPHONE (OUTPATIENT)
Dept: SLEEP MEDICINE | Facility: MEDICAL CENTER | Age: 58
End: 2023-09-29
Payer: COMMERCIAL

## 2023-09-29 NOTE — TELEPHONE ENCOUNTER
[facility] called to state that they done have Ventolin  but they do have the 18 gram Ventolin inhaler.    Pharmacy wants to know if the alternative is okay?    Please advise

## 2023-12-11 DIAGNOSIS — J44.89 COPD WITH ASTHMA (HCC): ICD-10-CM

## 2023-12-12 RX ORDER — MONTELUKAST SODIUM 10 MG/1
10 TABLET ORAL EVERY EVENING
Qty: 30 TABLET | Refills: 11 | Status: SHIPPED | OUTPATIENT
Start: 2023-12-12

## 2023-12-12 NOTE — TELEPHONE ENCOUNTER
Have we ever prescribed this med? Yes    Last OV: 9/28/23    Next OV: 4/25/24      Medications: MONTELUKAST 10MG TABS

## 2024-01-29 DIAGNOSIS — J44.89 COPD WITH ASTHMA (HCC): ICD-10-CM

## 2024-01-29 RX ORDER — FLUTICASONE FUROATE, UMECLIDINIUM BROMIDE AND VILANTEROL TRIFENATATE 200; 62.5; 25 UG/1; UG/1; UG/1
POWDER RESPIRATORY (INHALATION)
Qty: 3 EACH | Refills: 3 | Status: SHIPPED | OUTPATIENT
Start: 2024-01-29

## 2024-01-29 NOTE — TELEPHONE ENCOUNTER
Have we ever prescribed this med? Yes.  If yes, what date? 1/16/2023    Last OV: 9/28/2023 - Dr. Ma    Next OV: 4/25/2024 -  Dr. Ma     DX: COPD with asthma (HCC) [J44.9]     Medications: Fluticasone-Umeclidin-Vilant (TRELEGY ELLIPTA) 200-62.5-25 MCG/ACT AEROSOL POWDER, BREATH ACTIVATED

## 2024-07-25 ENCOUNTER — OFFICE VISIT (OUTPATIENT)
Dept: SLEEP MEDICINE | Facility: MEDICAL CENTER | Age: 59
End: 2024-07-25
Attending: INTERNAL MEDICINE
Payer: COMMERCIAL

## 2024-07-25 VITALS
SYSTOLIC BLOOD PRESSURE: 110 MMHG | OXYGEN SATURATION: 93 % | BODY MASS INDEX: 40.36 KG/M2 | WEIGHT: 227.8 LBS | DIASTOLIC BLOOD PRESSURE: 64 MMHG | HEIGHT: 63 IN | HEART RATE: 80 BPM

## 2024-07-25 DIAGNOSIS — Z87.891 FORMER SMOKER: ICD-10-CM

## 2024-07-25 DIAGNOSIS — J01.90 ACUTE NON-RECURRENT SINUSITIS, UNSPECIFIED LOCATION: ICD-10-CM

## 2024-07-25 DIAGNOSIS — J96.11 CHRONIC RESPIRATORY FAILURE WITH HYPOXIA (HCC): ICD-10-CM

## 2024-07-25 DIAGNOSIS — G47.33 OSA (OBSTRUCTIVE SLEEP APNEA): ICD-10-CM

## 2024-07-25 DIAGNOSIS — J44.89 COPD WITH ASTHMA (HCC): ICD-10-CM

## 2024-07-25 PROCEDURE — 94761 N-INVAS EAR/PLS OXIMETRY MLT: CPT | Performed by: INTERNAL MEDICINE

## 2024-07-25 PROCEDURE — 3078F DIAST BP <80 MM HG: CPT | Performed by: INTERNAL MEDICINE

## 2024-07-25 PROCEDURE — 99212 OFFICE O/P EST SF 10 MIN: CPT | Performed by: INTERNAL MEDICINE

## 2024-07-25 PROCEDURE — 99214 OFFICE O/P EST MOD 30 MIN: CPT | Performed by: INTERNAL MEDICINE

## 2024-07-25 PROCEDURE — 3074F SYST BP LT 130 MM HG: CPT | Performed by: INTERNAL MEDICINE

## 2024-07-25 RX ORDER — FEZOLINETANT 45 MG/1
TABLET, FILM COATED ORAL
COMMUNITY
Start: 2024-07-22

## 2024-07-25 RX ORDER — IPRATROPIUM BROMIDE AND ALBUTEROL SULFATE 2.5; .5 MG/3ML; MG/3ML
3 SOLUTION RESPIRATORY (INHALATION) EVERY 4 HOURS PRN
Qty: 120 ML | Refills: 11 | Status: SHIPPED | OUTPATIENT
Start: 2024-07-25

## 2024-07-25 RX ORDER — AZITHROMYCIN 250 MG/1
TABLET, FILM COATED ORAL
Qty: 6 TABLET | Refills: 0 | Status: SHIPPED | OUTPATIENT
Start: 2024-07-25

## 2024-07-25 ASSESSMENT — ENCOUNTER SYMPTOMS
EYE PAIN: 0
FOCAL WEAKNESS: 0
DIARRHEA: 0
BACK PAIN: 0
HEARTBURN: 0
SINUS PAIN: 0
TREMORS: 0
CLAUDICATION: 0
PALPITATIONS: 0
WEAKNESS: 0
HEMOPTYSIS: 0
BLURRED VISION: 0
EYE DISCHARGE: 0
SHORTNESS OF BREATH: 1
NECK PAIN: 0
STRIDOR: 0
HEADACHES: 1
MYALGIAS: 0
PHOTOPHOBIA: 0
ORTHOPNEA: 0
PND: 0
DIAPHORESIS: 0
SPEECH CHANGE: 0
COUGH: 0
ABDOMINAL PAIN: 0
CHILLS: 0
NAUSEA: 0
FALLS: 0
DOUBLE VISION: 0
EYE REDNESS: 0
DIZZINESS: 0
WHEEZING: 0
SORE THROAT: 0
CONSTIPATION: 0
FEVER: 0
SPUTUM PRODUCTION: 0
VOMITING: 0
DEPRESSION: 0
WEIGHT LOSS: 0

## 2024-07-25 ASSESSMENT — PATIENT HEALTH QUESTIONNAIRE - PHQ9: CLINICAL INTERPRETATION OF PHQ2 SCORE: 0

## 2024-08-20 ENCOUNTER — HOSPITAL ENCOUNTER (OUTPATIENT)
Dept: RADIOLOGY | Facility: MEDICAL CENTER | Age: 59
End: 2024-08-20
Attending: INTERNAL MEDICINE
Payer: COMMERCIAL

## 2024-08-20 DIAGNOSIS — J96.11 CHRONIC RESPIRATORY FAILURE WITH HYPOXIA (HCC): ICD-10-CM

## 2024-08-20 DIAGNOSIS — J44.89 COPD WITH ASTHMA (HCC): ICD-10-CM

## 2024-08-20 PROCEDURE — 71250 CT THORAX DX C-: CPT

## 2024-08-30 ENCOUNTER — TELEPHONE (OUTPATIENT)
Dept: SLEEP MEDICINE | Facility: MEDICAL CENTER | Age: 59
End: 2024-08-30
Payer: COMMERCIAL

## 2024-09-18 ENCOUNTER — HOSPITAL ENCOUNTER (OUTPATIENT)
Dept: LAB | Facility: MEDICAL CENTER | Age: 59
End: 2024-09-18
Attending: INTERNAL MEDICINE
Payer: COMMERCIAL

## 2024-09-18 ENCOUNTER — OFFICE VISIT (OUTPATIENT)
Dept: SLEEP MEDICINE | Facility: MEDICAL CENTER | Age: 59
End: 2024-09-18
Attending: INTERNAL MEDICINE
Payer: COMMERCIAL

## 2024-09-18 VITALS — BODY MASS INDEX: 39.16 KG/M2 | HEIGHT: 63 IN | WEIGHT: 221 LBS

## 2024-09-18 VITALS
WEIGHT: 221 LBS | HEIGHT: 63 IN | SYSTOLIC BLOOD PRESSURE: 112 MMHG | DIASTOLIC BLOOD PRESSURE: 66 MMHG | HEART RATE: 93 BPM | OXYGEN SATURATION: 93 % | BODY MASS INDEX: 39.16 KG/M2

## 2024-09-18 DIAGNOSIS — R91.1 LUNG NODULE: ICD-10-CM

## 2024-09-18 DIAGNOSIS — G47.33 OSA (OBSTRUCTIVE SLEEP APNEA): ICD-10-CM

## 2024-09-18 DIAGNOSIS — Z87.891 FORMER SMOKER: ICD-10-CM

## 2024-09-18 DIAGNOSIS — R94.39 ABNORMAL STRESS TEST: ICD-10-CM

## 2024-09-18 DIAGNOSIS — J44.89 COPD WITH ASTHMA (HCC): ICD-10-CM

## 2024-09-18 PROCEDURE — 36415 COLL VENOUS BLD VENIPUNCTURE: CPT

## 2024-09-18 PROCEDURE — 94060 EVALUATION OF WHEEZING: CPT | Performed by: INTERNAL MEDICINE

## 2024-09-18 PROCEDURE — 94726 PLETHYSMOGRAPHY LUNG VOLUMES: CPT | Performed by: INTERNAL MEDICINE

## 2024-09-18 PROCEDURE — 99214 OFFICE O/P EST MOD 30 MIN: CPT | Performed by: INTERNAL MEDICINE

## 2024-09-18 PROCEDURE — 94726 PLETHYSMOGRAPHY LUNG VOLUMES: CPT | Mod: 26 | Performed by: INTERNAL MEDICINE

## 2024-09-18 PROCEDURE — 82103 ALPHA-1-ANTITRYPSIN TOTAL: CPT

## 2024-09-18 PROCEDURE — 94729 DIFFUSING CAPACITY: CPT | Mod: 26 | Performed by: INTERNAL MEDICINE

## 2024-09-18 PROCEDURE — 3074F SYST BP LT 130 MM HG: CPT | Performed by: INTERNAL MEDICINE

## 2024-09-18 PROCEDURE — 94060 EVALUATION OF WHEEZING: CPT | Mod: 26 | Performed by: INTERNAL MEDICINE

## 2024-09-18 PROCEDURE — 3078F DIAST BP <80 MM HG: CPT | Performed by: INTERNAL MEDICINE

## 2024-09-18 PROCEDURE — 99212 OFFICE O/P EST SF 10 MIN: CPT | Performed by: INTERNAL MEDICINE

## 2024-09-18 PROCEDURE — 94729 DIFFUSING CAPACITY: CPT | Performed by: INTERNAL MEDICINE

## 2024-09-18 RX ORDER — ALBUTEROL SULFATE 90 UG/1
AEROSOL, METERED RESPIRATORY (INHALATION)
Qty: 8.5 G | Refills: 6 | Status: SHIPPED | OUTPATIENT
Start: 2024-09-18

## 2024-09-18 RX ORDER — FLUTICASONE FUROATE, UMECLIDINIUM BROMIDE AND VILANTEROL TRIFENATATE 200; 62.5; 25 UG/1; UG/1; UG/1
1 POWDER RESPIRATORY (INHALATION) DAILY
Qty: 3 EACH | Refills: 3 | Status: SHIPPED | OUTPATIENT
Start: 2024-09-18

## 2024-09-18 ASSESSMENT — ENCOUNTER SYMPTOMS
FOCAL WEAKNESS: 0
SPEECH CHANGE: 0
MYALGIAS: 0
CLAUDICATION: 0
SINUS PAIN: 0
PND: 0
ABDOMINAL PAIN: 0
DIAPHORESIS: 0
EYE REDNESS: 0
WHEEZING: 0
BACK PAIN: 0
DOUBLE VISION: 0
VOMITING: 0
DIARRHEA: 0
DEPRESSION: 0
COUGH: 0
EYE PAIN: 0
STRIDOR: 0
SPUTUM PRODUCTION: 0
FEVER: 0
TREMORS: 0
BLURRED VISION: 0
WEIGHT LOSS: 0
HEMOPTYSIS: 0
CHILLS: 0
HEARTBURN: 0
DIZZINESS: 0
ORTHOPNEA: 0
HEADACHES: 0
PHOTOPHOBIA: 0
NECK PAIN: 0
EYE DISCHARGE: 0
WEAKNESS: 0
NAUSEA: 0
FALLS: 0
SORE THROAT: 0
SHORTNESS OF BREATH: 1
PALPITATIONS: 0
CONSTIPATION: 0

## 2024-09-18 NOTE — PROCEDURES
Technician: Yany Esqueda RRT, CPFT  Good patient effort & cooperation.  The results of this test meet the ATS/ERS standards for acceptability & reproducibility.  Test was performed on the [employer] Body Plethysmograph-Elite DX system.  Predicted equations for Spirometry are GLI-2012, ITS for lung volumes, and GLI-2017 for DLCO.  The DLCO was uncorrected for Hgb.  A bronchodilator of Ventolin HFA -2puffs via spacer administered.  DLCO performed during dilation period. Patient C/O claustrophobia, but was able to perform the Pleth maneuver via Plethysmography without incident.    Interpretation;   Baseline spirometry shows airflow obstruction with an FEV1/FVC ratio 64 and an FEV1 of 1.43 L or 57% predicted.  No significant bronchodilator response.  Total lung capacity is normal however there is evidence for air trapping with residual volume of 168% predicted.  Diffusion capacity is low normal at 81% predicted.  Pulmonary function testing shows fixed airflow obstruction with mild air trapping and low normal DLCO consistent with chronic obstructive pulmonary disease.

## 2024-09-18 NOTE — PROGRESS NOTES
Chief Complaint   Patient presents with    Follow-Up     LAST SEEN 7/25/24    Results     CT CHEST 8/20/24, PFT 9/18/24         HPI: This patient is a 59 y.o. female whom is followed in our clinic for COPD c/b chronic hypoxemic respiratory failure last seen by me on 7/25/24.   PMHx is significant for UC not currently on any therapy and hx of bowel perforation, asthma in her 22 with environmental allergies reportedly worse in [place] than [place]. She is a former smoker with estimated 20 pk year tobacco hx, tobacco free since 2016. She was dx with COPD roughly 6 years ago and was on Symbicort and Spiriva at the time of my initial visit with her.  She was hospitalized in [place] in 2022 and weaned off supplemental oxygen but had poor control of wheezing and shortness of breath at the time of our first visit using albuterol 3-4 times daily. We transitioned her to Trelegy 200 and she has not had need for prednisone, urgent care visit or hospitalization for respiratory issues since then. Pulmonary function testing from our last visit in September showed FEV1/FVC ratio 61 and FEV1 postbronchodilator 1.41 L or 56% predicted. No bronchodilator responsiveness by ATS criteria however she has significant response in the mid flows. Total lung capacity is normal but she does have some air trapping and DLCO is just below lower limits of normal at 77% predicted. Updated PFT today is stable. TTE from [facility] in June was normal however she recently had nuclear stress test which was abnormal and is pending [facility]. She is quite dyspneic with any activity and using O2 with activity which was prescribed at out last visit. CT chest since our last visit showed moderate emphysema and 7 mm RUL nodule. Pt is concerned regarding the emphysema and the nodule. Functionally she is MMRC class 3.    Past Medical History:   Diagnosis Date    Asthma     Back pain     Bronchitis     COPD (chronic obstructive pulmonary disease) (HCC)     Earache     Frequent  urination     Pneumonia     Restless leg syndrome     Ringing in ears     Shortness of breath     Wears glasses     Wheezing        Social History     Socioeconomic History    Marital status: Single     Spouse name: Not on file    Number of children: Not on file    Years of education: Not on file    Highest education level: Not on file   Occupational History    Not on file   Tobacco Use    Smoking status: Former     Current packs/day: 0.00     Average packs/day: 0.5 packs/day for 37.0 years (18.5 ttl pk-yrs)     Types: Cigarettes     Start date: 1979     Quit date: 2016     Years since quittin.7     Passive exposure: Past    Smokeless tobacco: Never   Vaping Use    Vaping status: Never Used   Substance and Sexual Activity    Alcohol use: Not Currently     Alcohol/week: 3.6 oz     Types: 6 Standard drinks or equivalent per week     Comment: I quit drinking 2022    Drug use: Not Currently     Types: Methamphetamines    Sexual activity: Not on file   Other Topics Concern    Not on file   Social History Narrative    Not on file     Social Determinants of Health     Financial Resource Strain: Not on file   Food Insecurity: Not on file   Transportation Needs: Not on file   Physical Activity: Not on file   Stress: Not on file   Social Connections: Not on file   Intimate Partner Violence: Not on file   Housing Stability: Not on file       Family History   Problem Relation Age of Onset    Asthma Mother     Cancer Mother     Asthma Father     Cancer Father     Asthma Maternal Uncle        Current Outpatient Medications on File Prior to Visit   Medication Sig Dispense Refill    VEOZAH 45 MG Tab       ipratropium-albuterol (DUONEB) 0.5-2.5 (3) MG/3ML nebulizer solution Take 3 mL by nebulization every four hours as needed for Shortness of Breath. 120 mL 11    Meclizine HCl 25 MG Chew Tab TAKE ONE TABLET BY MOUTH 3-TIMES A DAY AS NEEDED FOR NAUSEA & VOMITING      azithromycin (ZITHROMAX) 250 MG Tab Take 2  "tablets on day 1, then take 1 tablet a day for 4 days. 6 Tablet 0    montelukast (SINGULAIR) 10 MG Tab TAKE 1 TABLET BY MOUTH EVERY EVENING (Patient not taking: Reported on 9/18/2024) 30 Tablet 11    oxybutynin SR (DITROPAN-XL) 10 MG CR tablet Take 20 mg by mouth every day.      ROPINIRole (REQUIP) 1 MG Tab Take 1 mg by mouth every evening. (Patient not taking: Reported on 9/18/2024)      venlafaxine XR (EFFEXOR XR) 37.5 MG CAPSULE SR 24 HR Take 37.5 mg by mouth every day.       No current facility-administered medications on file prior to visit.       Aspirin, Doxycycline, and Duloxetine      ROS:   Review of Systems   Constitutional:  Negative for chills, diaphoresis, fever, malaise/fatigue and weight loss.   HENT:  Negative for congestion, ear discharge, ear pain, hearing loss, nosebleeds, sinus pain, sore throat and tinnitus.    Eyes:  Negative for blurred vision, double vision, photophobia, pain, discharge and redness.   Respiratory:  Positive for shortness of breath. Negative for cough, hemoptysis, sputum production, wheezing and stridor.    Cardiovascular:  Negative for chest pain, palpitations, orthopnea, claudication, leg swelling and PND.   Gastrointestinal:  Negative for abdominal pain, constipation, diarrhea, heartburn, nausea and vomiting.   Genitourinary:  Negative for dysuria and urgency.   Musculoskeletal:  Negative for back pain, falls, joint pain, myalgias and neck pain.   Skin:  Negative for itching and rash.   Neurological:  Negative for dizziness, tremors, speech change, focal weakness, weakness and headaches.   Endo/Heme/Allergies:  Negative for environmental allergies.   Psychiatric/Behavioral:  Negative for depression.        /66 (BP Location: Right arm, Patient Position: Sitting, BP Cuff Size: Adult)   Pulse 93   Ht 1.6 m (5' 3\")   Wt 100 kg (221 lb)   SpO2 93%   Physical Exam  Constitutional:       General: She is not in acute distress.     Appearance: Normal appearance. She is " well-developed and normal weight.   HENT:      Head: Normocephalic and atraumatic.      Right Ear: External ear normal.      Left Ear: External ear normal.      Nose: Nose normal. No congestion.      Mouth/Throat:      Mouth: Mucous membranes are moist.      Pharynx: Oropharynx is clear. No oropharyngeal exudate.   Eyes:      General: No scleral icterus.     Extraocular Movements: Extraocular movements intact.      Conjunctiva/sclera: Conjunctivae normal.      Pupils: Pupils are equal, round, and reactive to light.   Neck:      Vascular: No JVD.      Trachea: No tracheal deviation.   Cardiovascular:      Rate and Rhythm: Normal rate and regular rhythm.      Heart sounds: Normal heart sounds. No murmur heard.     No friction rub. No gallop.   Pulmonary:      Effort: Pulmonary effort is normal. No accessory muscle usage or respiratory distress.      Breath sounds: Normal breath sounds. No wheezing or rales.   Abdominal:      General: There is no distension.      Palpations: Abdomen is soft.      Tenderness: There is no abdominal tenderness.   Musculoskeletal:         General: No tenderness or deformity. Normal range of motion.      Cervical back: Normal range of motion and neck supple.      Right lower leg: No edema.      Left lower leg: No edema.   Lymphadenopathy:      Cervical: No cervical adenopathy.   Skin:     General: Skin is warm and dry.      Findings: No rash.      Nails: There is no clubbing.   Neurological:      Mental Status: She is alert and oriented to person, place, and time.      Cranial Nerves: No cranial nerve deficit.      Gait: Gait normal.   Psychiatric:         Behavior: Behavior normal.       PFTs as reviewed by me personally:as per hPI    Imaging as reviewed by me personally:  as per hPI    Assessment:  1. Lung nodule  CT-CHEST (THORAX) W/O      2. COPD with asthma (HCC)  ALPHA-1-ANTITRYPSIN    fluticasone-umeclidinium-vilanterol (TRELEGY ELLIPTA) 200-62.5-25 mcg/act inhaler    VENTOLIN HFA  108 (90 Base) MCG/ACT Aero Soln inhalation aerosol      3. JOSÉ MIGUEL (obstructive sleep apnea)        4. Former smoker        5. Abnormal stress test            Plan:  Will proceed with short interval f/u with CT chest in 3 mos or November.  This is chronic but her obstruction and emphysema does seem out of proportion to her age and tobacco history. We will proceed with alpha one antitrypsin and continue trelegy and prn Tirso with O2 with activity. We did discuss pulmonary rehab but this is not practical for her in [place]. Tobacco free.  Has appt with sleep medicine tomorrow.  Tobacco free.   Has cardiology f/u for [facility]  Return in about 4 months (around 1/18/2025) for ct chest in november, lab.

## 2024-09-19 ENCOUNTER — OFFICE VISIT (OUTPATIENT)
Dept: SLEEP MEDICINE | Facility: MEDICAL CENTER | Age: 59
End: 2024-09-19
Attending: PHYSICIAN ASSISTANT
Payer: COMMERCIAL

## 2024-09-19 VITALS
DIASTOLIC BLOOD PRESSURE: 62 MMHG | SYSTOLIC BLOOD PRESSURE: 118 MMHG | WEIGHT: 221.6 LBS | RESPIRATION RATE: 18 BRPM | HEART RATE: 97 BPM | HEIGHT: 63 IN | OXYGEN SATURATION: 94 % | BODY MASS INDEX: 39.27 KG/M2

## 2024-09-19 DIAGNOSIS — G47.33 OSA (OBSTRUCTIVE SLEEP APNEA): ICD-10-CM

## 2024-09-19 PROCEDURE — 3078F DIAST BP <80 MM HG: CPT | Performed by: PHYSICIAN ASSISTANT

## 2024-09-19 PROCEDURE — 3074F SYST BP LT 130 MM HG: CPT | Performed by: PHYSICIAN ASSISTANT

## 2024-09-19 PROCEDURE — 99213 OFFICE O/P EST LOW 20 MIN: CPT | Performed by: PHYSICIAN ASSISTANT

## 2024-09-19 ASSESSMENT — ENCOUNTER SYMPTOMS
SPUTUM PRODUCTION: 1
SINUS PAIN: 0
WEIGHT LOSS: 0
INSOMNIA: 0
SHORTNESS OF BREATH: 1
CHILLS: 0
COUGH: 1
PALPITATIONS: 1
TREMORS: 0
HEADACHES: 1
ORTHOPNEA: 0
WHEEZING: 1
HEARTBURN: 1
DIZZINESS: 1
SORE THROAT: 0
FEVER: 0

## 2024-09-19 NOTE — PROGRESS NOTES
"Chief Complaint   Patient presents with    Apnea     Last Office Visit 5/25/23 with      Sleep Study Complete on 6/4/23     Polysomnography Titration - 6/4/23    Settings: ModeVAuto BIPAP  Min EPAP (cmH2O)8.0  Max IPAP (cmH2O)16.0  Pressure support (cmH2O)4.0  with 2 L O2 bleed in.     Set up: 8/24/23       HPI:  Mila Acuña is a 59 y.o. year old female here today for follow-up on obstructive sleep apnea.  Last seen in clinic 5/25/2023 by Dr. Adrian Palm.  Patient followed by pulmonary clinic for her asthma/COPD.     Past Medical History: Asthma, bronchitis, COPD, pneumonia, restless leg syndrome, JOSÉ MIGUEL.    Vitals:  /62 (BP Location: Left arm, Patient Position: Sitting, BP Cuff Size: Large adult)   Pulse 97   Resp 18   Ht 1.6 m (5' 3\")   Wt 101 kg (221 lb 9.6 oz)   SpO2 94% BMI of 39.25 kg/m².    Recent Imaging: Chest CT obtained 8/20/2024 demonstrated 7 mm right upper lobe nodule, no significant lymphadenopathy, emphysema, umbilical hernia, small ventral hernia.    Currently using  Resmed auto Bi-PAP @16/8/4 cm H20 pressure; compliance reviewed for 7/22/2024 through 9/19/2024, days used 59/60, average daily usage 10 hours 50 minutes, 98% of days greater than or equal to 4 hours, mask leak at 19.7 LPM at 95th percentile, AHI 1.7 per hour.  See media for full report.  Currently using 2 L O2 with BiPAP.    Device obtained 8/24/2023  DME provider [facility] in [place]  Mask interface fullface mask    Polysomnogram obtained 4/11/2023 demonstrated mild JOSÉ MIGUEL with overall AHI of 12.8 events per hour, severe nocturnal hypoxia noted with low O2 sat of 67% during REM sleep with sats less than or equal to 88% for 370.6 minutes of recorded time.  Severity of nocturnal hypoxia out of proportion compared to  degree of sleep apnea.  Titration study recommended due to severity of hypoxia.    Titration study obtained 6/4/2023 demonstrated persistent hypoxia with 300.5 minutes of total sleep time with sats " less than or equal to 88% and low O2 sat of 82%.  Patient was trialed on CPAP and BiPAP.  Recommendation was made for auto BiPAP of 16/8/4 with supplemental oxygen at 2 L.  Follow-up overnight oximetry on BiPAP only once stable recommended.    Sleep schedule goes to bed 9 PM, wakens 7-8 a.m. , and gets up during the night to-6 times   Symptoms denies day time somnolence, reports morning headaches , fatigue, and continues to experience frequent awakenings.    Sherrill Sleepiness Scale reported as 3/24 on 3/22/2023      Review of Systems   Constitutional:  Positive for malaise/fatigue. Negative for chills, fever and weight loss.   HENT:  Positive for congestion, hearing loss and tinnitus (chronic near constant). Negative for nosebleeds, sinus pain and sore throat.    Eyes:         Presc glasses    Respiratory:  Positive for cough, sputum production (swallows, can't get up), shortness of breath and wheezing.    Cardiovascular:  Positive for palpitations and leg swelling (mild). Negative for chest pain and orthopnea.   Gastrointestinal:  Positive for heartburn (increased lately).        Upper and lower dentures, occasional issue with liquids   Neurological:  Positive for dizziness and headaches (morning headaches). Negative for tremors.   Psychiatric/Behavioral:  The patient does not have insomnia.        Past Medical History:   Diagnosis Date    Asthma     Back pain     Bronchitis     COPD (chronic obstructive pulmonary disease) (HCC)     Earache     Frequent urination     Pneumonia     Restless leg syndrome     Ringing in ears     Shortness of breath     Wears glasses     Wheezing        Past Surgical History:   Procedure Laterality Date    CHOLECYSTECTOMY      PRIMARY C SECTION         Family History   Problem Relation Age of Onset    Asthma Mother     Cancer Mother     Asthma Father     Cancer Father     Asthma Maternal Uncle        Social History     Socioeconomic History    Marital status: Single     Spouse name:  Not on file    Number of children: Not on file    Years of education: Not on file    Highest education level: Not on file   Occupational History    Not on file   Tobacco Use    Smoking status: Former     Current packs/day: 0.00     Average packs/day: 0.5 packs/day for 37.0 years (18.5 ttl pk-yrs)     Types: Cigarettes     Start date: 1979     Quit date: 2016     Years since quittin.7     Passive exposure: Past    Smokeless tobacco: Never   Vaping Use    Vaping status: Never Used   Substance and Sexual Activity    Alcohol use: Not Currently     Alcohol/week: 3.6 oz     Types: 6 Standard drinks or equivalent per week     Comment: I quit drinking 2022    Drug use: Not Currently     Types: Methamphetamines    Sexual activity: Not on file   Other Topics Concern    Not on file   Social History Narrative    Not on file     Social Determinants of Health     Financial Resource Strain: Not on file   Food Insecurity: Not on file   Transportation Needs: Not on file   Physical Activity: Not on file   Stress: Not on file   Social Connections: Not on file   Intimate Partner Violence: Not on file   Housing Stability: Not on file       Allergies as of 2024 - Reviewed 2024   Allergen Reaction Noted    Aspirin  2022    Doxycycline  2022    Duloxetine  2022          Current medications as of today   Current Outpatient Medications   Medication Sig Dispense Refill    fluticasone-umeclidinium-vilanterol (TRELEGY ELLIPTA) 200-62.5-25 mcg/act inhaler Inhale 1 Puff every day. 3 Each 3    VENTOLIN  (90 Base) MCG/ACT Aero Soln inhalation aerosol INHALE 1 PUFF BY MOUTH EVERY 4 HOURS AS NEEDED FOR WHEEZING 8.5 g 6    VEOZAH 45 MG Tab       ipratropium-albuterol (DUONEB) 0.5-2.5 (3) MG/3ML nebulizer solution Take 3 mL by nebulization every four hours as needed for Shortness of Breath. 120 mL 11    montelukast (SINGULAIR) 10 MG Tab TAKE 1 TABLET BY MOUTH EVERY EVENING 30 Tablet 11     Meclizine HCl 25 MG Chew Tab TAKE ONE TABLET BY MOUTH 3-TIMES A DAY AS NEEDED FOR NAUSEA & VOMITING      ROPINIRole (REQUIP) 1 MG Tab Take 1 mg by mouth every evening.      azithromycin (ZITHROMAX) 250 MG Tab Take 2 tablets on day 1, then take 1 tablet a day for 4 days. 6 Tablet 0    oxybutynin SR (DITROPAN-XL) 10 MG CR tablet Take 20 mg by mouth every day.      venlafaxine XR (EFFEXOR XR) 37.5 MG CAPSULE SR 24 HR Take 37.5 mg by mouth every day.       No current facility-administered medications for this visit.         Physical Exam:   Gen:           Alert and oriented, No apparent distress. Mood and affect appropriate, normal interaction with examiner.   Hearing:     Grossly intact.  Nose:          Normal, no lesions or deformities.  Dentition:    Upper and lower dentures  Oropharynx:   Tongue normal, posterior pharynx without erythema or exudate.  Mallampati Classification: IV  Neck:        Supple, trachea midline, no masses.  Respiratory Effort: No intercostal retractions or use of accessory muscles.   Gait and Station: Normal.  Digits and Nails: No clubbing, cyanosis, petechiae, or nodes.   Skin:        No rashes, lesions or ulcers noted.               Ext:           No cyanosis or edema.      Immunizations:  Flu: Annual recommended  Pneumovax 23: 11/24/2020  PCV 20: 3/15/2023  SARS CoV2 Vaccine: Recommended    Assessment / Plan:  1. JOSÉ MIGUEL (obstructive sleep apnea)  - DME Other  - DME Mask and Supplies    Consider extended release melatonin at 3 to 5 mg for sleep maintenance issues, discussed brand preference for product purity.  Reviewed compliance, demonstrating use and benefit.  Updated order for mask and supplies to be sent to [facility] in [place].  Will need overnight oximetry on BiPAP and 2 L due to frequent awakenings and morning headaches.  Obtain overnight oximetry through [facility] in [place].  Patient was reminded to use distilled water only in humidifier chamber, reviewed equipment cleaning,  reviewed equipment replacement schedule.  Patient will consider use of saline spray prior to bedtime, is complaining of oral dryness despite no significant mask leak, could consider XyliMelts use.  Follow-up in 1 year, sooner if needed.  Contact patient via [employer] regarding overnight oximetry results.      Follow-up:   Return in about 1 year (around 9/19/2025) for Return with Nicole Duvall PA-C.    Please note that this dictation was created using voice recognition software. I have made every reasonable attempt to correct obvious errors, but it is possible there are errors of grammar and possibly content that I did not discover before finalizing the note.

## 2024-09-19 NOTE — PATIENT INSTRUCTIONS
1-consider extended release melatonin at 3-5 mg (Pure Encapsulation and Remfresh)   2-reviewed compliance  3-demonstrated use and benefit   4-updated order for mask and supplies  5-order to allow wireless access  6-will need overnight oximetry currently on bipap and oxygen at 2L to assess for adequate night time oxygen levels, frequent awakenings and morning headaches  7-As a reminder use distilled water only in humidifier chamber.    8-Today we reviewed equipment cleaning  once weekly minimum  mask, tubing and water chamber  use dedicated container  use mild soap and water  SoClean or other ozone  are not recommended  white vinegar and water solution is no longer recommended  hang tubing to dry  mask sanitizing wipes are an option for use   9-Equipment replacement schedule : Mask every 6 months, Head gear every 6 months, Tubing every 3 months, Ultra-fine filters 2 times per month, Humidifier chamber every 6 months  10-follow up in one year, sooner if needed    11-consider use of saline spray before bed   12-xylimelts can help with oral dryness as not experiencing significant mask leak

## 2024-09-20 LAB — A1AT SERPL-MCNC: 152 MG/DL (ref 90–200)

## 2024-11-12 ENCOUNTER — HOSPITAL ENCOUNTER (OUTPATIENT)
Dept: RADIOLOGY | Facility: MEDICAL CENTER | Age: 59
End: 2024-11-12
Attending: INTERNAL MEDICINE
Payer: COMMERCIAL

## 2024-11-12 DIAGNOSIS — R91.1 LUNG NODULE: ICD-10-CM

## 2024-11-12 PROCEDURE — 71250 CT THORAX DX C-: CPT

## 2024-11-14 ENCOUNTER — TELEPHONE (OUTPATIENT)
Dept: SLEEP MEDICINE | Facility: MEDICAL CENTER | Age: 59
End: 2024-11-14
Payer: COMMERCIAL

## 2024-11-14 DIAGNOSIS — R91.1 LUNG NODULE: ICD-10-CM

## 2024-11-15 NOTE — TELEPHONE ENCOUNTER
Spoke with pt and reviewed CT with increase in nodule size. Nodule is peripheral in location and may be amenable to surgical resection with wedge. Pt would like to be evaluated for surgical management therefore urgent referral placed to [facility]. Pt agreeable to this.

## 2024-11-15 NOTE — TELEPHONE ENCOUNTER
Patient called to request for her results of her CT CHEST. She states that she read part of her results through [facility] and is concerned on what she saw.

## 2024-12-05 ENCOUNTER — HOSPITAL ENCOUNTER (OUTPATIENT)
Dept: LAB | Facility: MEDICAL CENTER | Age: 59
End: 2024-12-05
Payer: COMMERCIAL

## 2024-12-05 LAB
ALBUMIN SERPL BCP-MCNC: 4.1 G/DL (ref 3.2–4.9)
ALBUMIN/GLOB SERPL: 1.1 G/DL
ALP SERPL-CCNC: 65 U/L (ref 30–99)
ALT SERPL-CCNC: 15 U/L (ref 2–50)
ANION GAP SERPL CALC-SCNC: 9 MMOL/L (ref 7–16)
AST SERPL-CCNC: 18 U/L (ref 12–45)
BASOPHILS # BLD AUTO: 0.5 % (ref 0–1.8)
BASOPHILS # BLD: 0.05 K/UL (ref 0–0.12)
BILIRUB SERPL-MCNC: 0.3 MG/DL (ref 0.1–1.5)
BUN SERPL-MCNC: 21 MG/DL (ref 8–22)
CALCIUM ALBUM COR SERPL-MCNC: 9.3 MG/DL (ref 8.5–10.5)
CALCIUM SERPL-MCNC: 9.4 MG/DL (ref 8.5–10.5)
CHLORIDE SERPL-SCNC: 104 MMOL/L (ref 96–112)
CO2 SERPL-SCNC: 24 MMOL/L (ref 20–33)
CREAT SERPL-MCNC: 0.78 MG/DL (ref 0.5–1.4)
EOSINOPHIL # BLD AUTO: 0.22 K/UL (ref 0–0.51)
EOSINOPHIL NFR BLD: 2.3 % (ref 0–6.9)
ERYTHROCYTE [DISTWIDTH] IN BLOOD BY AUTOMATED COUNT: 47.2 FL (ref 35.9–50)
GFR SERPLBLD CREATININE-BSD FMLA CKD-EPI: 88 ML/MIN/1.73 M 2
GLOBULIN SER CALC-MCNC: 3.7 G/DL (ref 1.9–3.5)
GLUCOSE SERPL-MCNC: 103 MG/DL (ref 65–99)
HCT VFR BLD AUTO: 36.6 % (ref 37–47)
HGB BLD-MCNC: 11.6 G/DL (ref 12–16)
IMM GRANULOCYTES # BLD AUTO: 0.03 K/UL (ref 0–0.11)
IMM GRANULOCYTES NFR BLD AUTO: 0.3 % (ref 0–0.9)
LYMPHOCYTES # BLD AUTO: 4.13 K/UL (ref 1–4.8)
LYMPHOCYTES NFR BLD: 43.5 % (ref 22–41)
MCH RBC QN AUTO: 28.9 PG (ref 27–33)
MCHC RBC AUTO-ENTMCNC: 31.7 G/DL (ref 32.2–35.5)
MCV RBC AUTO: 91.3 FL (ref 81.4–97.8)
MONOCYTES # BLD AUTO: 0.68 K/UL (ref 0–0.85)
MONOCYTES NFR BLD AUTO: 7.2 % (ref 0–13.4)
NEUTROPHILS # BLD AUTO: 4.39 K/UL (ref 1.82–7.42)
NEUTROPHILS NFR BLD: 46.2 % (ref 44–72)
NRBC # BLD AUTO: 0 K/UL
NRBC BLD-RTO: 0 /100 WBC (ref 0–0.2)
PLATELET # BLD AUTO: 385 K/UL (ref 164–446)
PMV BLD AUTO: 9.2 FL (ref 9–12.9)
POTASSIUM SERPL-SCNC: 4.2 MMOL/L (ref 3.6–5.5)
PROT SERPL-MCNC: 7.8 G/DL (ref 6–8.2)
RBC # BLD AUTO: 4.01 M/UL (ref 4.2–5.4)
SODIUM SERPL-SCNC: 137 MMOL/L (ref 135–145)
WBC # BLD AUTO: 9.5 K/UL (ref 4.8–10.8)

## 2024-12-05 PROCEDURE — 85025 COMPLETE CBC W/AUTO DIFF WBC: CPT

## 2024-12-05 PROCEDURE — 36415 COLL VENOUS BLD VENIPUNCTURE: CPT

## 2024-12-05 PROCEDURE — 80053 COMPREHEN METABOLIC PANEL: CPT

## 2024-12-26 ENCOUNTER — APPOINTMENT (OUTPATIENT)
Dept: RADIOLOGY | Facility: MEDICAL CENTER | Age: 59
End: 2024-12-26
Attending: STUDENT IN AN ORGANIZED HEALTH CARE EDUCATION/TRAINING PROGRAM
Payer: COMMERCIAL

## 2025-01-10 ENCOUNTER — HOSPITAL ENCOUNTER (OUTPATIENT)
Dept: RADIOLOGY | Facility: MEDICAL CENTER | Age: 60
End: 2025-01-10
Attending: STUDENT IN AN ORGANIZED HEALTH CARE EDUCATION/TRAINING PROGRAM
Payer: COMMERCIAL

## 2025-01-10 DIAGNOSIS — R91.1 SOLITARY PULMONARY NODULE: ICD-10-CM

## 2025-01-10 LAB — GLUCOSE BLD-MCNC: 105 MG/DL (ref 65–99)

## 2025-01-10 PROCEDURE — A9552 F18 FDG: HCPCS

## 2025-01-21 ENCOUNTER — PATIENT MESSAGE (OUTPATIENT)
Dept: SLEEP MEDICINE | Facility: MEDICAL CENTER | Age: 60
End: 2025-01-21
Payer: COMMERCIAL

## 2025-01-21 ENCOUNTER — TELEPHONE (OUTPATIENT)
Dept: SLEEP MEDICINE | Facility: MEDICAL CENTER | Age: 60
End: 2025-01-21
Payer: COMMERCIAL

## 2025-01-21 DIAGNOSIS — G47.33 OSA (OBSTRUCTIVE SLEEP APNEA): ICD-10-CM

## 2025-01-21 DIAGNOSIS — G47.34 NOCTURNAL HYPOXIA: ICD-10-CM

## 2025-01-21 NOTE — TELEPHONE ENCOUNTER
Patient left a voicemail in regards of a MASK FITTING ORDER.    Called [facility] to verify if a mask fitting order is required. [facility] informed that patient's next shipment in February can address mask fitting inquiry.    Asked [facility] as well if they received a referral for an overnight oximetry placed by the provider.  [facility] stated no. Faxed the overnight oximetry referral to [facility] 2 attempts.    Called patient to inform her of this.    Patient states she completed her overnight oximetry after her office visit on 9/19/24 with Zaire CORDOVA.    Called [facility] again to confirm. Patient completed overnight oximetry on 10/23/24.    [facility] faxed overnight oximetry. Overnight oximetry scanned into media.

## 2025-01-22 NOTE — PROGRESS NOTES
Mask fitting to [facility] in [place].  Overnight oximetry results reviewed did demonstrate significant hypoxia with low O2 sat of 57% and sats less than or equal to 88 for 38 minutes.  Patient called and confirmed study was completed on 2 L oxygen bleed in plus BiPAP which it was.  Advised to increase O2 to 3 L at this time pending mask fitting and resolution/improvement of current mask leak.  Short-term follow-up advised.

## 2025-03-31 ENCOUNTER — OFFICE VISIT (OUTPATIENT)
Dept: SLEEP MEDICINE | Facility: MEDICAL CENTER | Age: 60
End: 2025-03-31
Attending: PHYSICIAN ASSISTANT
Payer: COMMERCIAL

## 2025-03-31 VITALS
WEIGHT: 237.5 LBS | RESPIRATION RATE: 18 BRPM | DIASTOLIC BLOOD PRESSURE: 72 MMHG | HEART RATE: 84 BPM | SYSTOLIC BLOOD PRESSURE: 124 MMHG | BODY MASS INDEX: 42.08 KG/M2 | HEIGHT: 63 IN | OXYGEN SATURATION: 95 %

## 2025-03-31 DIAGNOSIS — G47.33 OSA (OBSTRUCTIVE SLEEP APNEA): ICD-10-CM

## 2025-03-31 DIAGNOSIS — G47.34 NOCTURNAL HYPOXIA: ICD-10-CM

## 2025-03-31 DIAGNOSIS — G47.00 INSOMNIA, UNSPECIFIED TYPE: ICD-10-CM

## 2025-03-31 PROCEDURE — 3074F SYST BP LT 130 MM HG: CPT | Performed by: PHYSICIAN ASSISTANT

## 2025-03-31 PROCEDURE — 99213 OFFICE O/P EST LOW 20 MIN: CPT | Performed by: PHYSICIAN ASSISTANT

## 2025-03-31 PROCEDURE — 3078F DIAST BP <80 MM HG: CPT | Performed by: PHYSICIAN ASSISTANT

## 2025-03-31 RX ORDER — RISANKIZUMAB-RZAA 180 MG/1.2
KIT SUBCUTANEOUS
COMMUNITY
Start: 2025-02-06

## 2025-03-31 RX ORDER — SEMAGLUTIDE 0.68 MG/ML
INJECTION, SOLUTION SUBCUTANEOUS
COMMUNITY
Start: 2025-01-22

## 2025-03-31 RX ORDER — ALPRAZOLAM 0.25 MG
0.25 TABLET ORAL NIGHTLY PRN
Qty: 14 TABLET | Refills: 0 | Status: SHIPPED | OUTPATIENT
Start: 2025-03-31 | End: 2025-04-30

## 2025-03-31 RX ORDER — GABAPENTIN 300 MG
CAPSULE ORAL
COMMUNITY
Start: 2025-01-10

## 2025-03-31 ASSESSMENT — ENCOUNTER SYMPTOMS
ROS GI COMMENTS: UPPER AND LOWER DENTURES, NO DIFFICULTY SWALLOWING
HEADACHES: 1
COUGH: 0
WEIGHT LOSS: 0
SHORTNESS OF BREATH: 1
FEVER: 0
TREMORS: 0
HEARTBURN: 0
DIZZINESS: 1
PALPITATIONS: 0
WHEEZING: 1
SPUTUM PRODUCTION: 0
INSOMNIA: 1
SORE THROAT: 0
ORTHOPNEA: 0
CHILLS: 0
SINUS PAIN: 0

## 2025-03-31 ASSESSMENT — FIBROSIS 4 INDEX: FIB4 SCORE: 0.7

## 2025-03-31 NOTE — PROGRESS NOTES
"Chief Complaint   Patient presents with    Apnea     Last Office Visit 9/19/24 with Nicole Duvall P.A.-C.    V[facility] OPO COMPLETED ON 10/23/24 - OPO ON BIPAP AT 2LPM    Settings: ModeVAuto BIPAP  Min EPAP (cmH2O)8.0  Max IPAP (cmH2O)16.0  Pressure support (cmH2O)4.0 with 3L O2 bleed in.     Set up: 8/24/23         HPI:  Mila Acuña is a 60 y.o. year old female here today for follow-up on obstructive sleep apnea.  Last seen in clinic 9/19/2024 by ART Bhandari.  Previously evaluated by Dr. Adrian Palm on 5/25/2023.  Patient is also followed by pulmonary clinic for her asthma/COPD.    Past Medical History: JOSÉ MIGUEL, obesity, asthma, bronchitis, COPD, pneumonia, restless leg syndrome.  Intolerance to extended release melatonin due to increase restless legs.    Vitals:  /72 (BP Location: Left arm, Patient Position: Sitting, BP Cuff Size: Large adult)   Pulse 84   Resp 18   Ht 1.6 m (5' 3\")   Wt 108 kg (237 lb 8 oz)   SpO2 95% BMI of 42 kg/m².     Recent Imaging: PET CT obtained 1/10/2025 for solitary right upper lobe pulmonary nodule, nodule smaller less conspicuous without increased uptake.  No hypermetabolic hilar, mediastinal or axillary lymphadenopathy.  This is likely a resolving infection.     Chest CT obtained 8/20/2024 demonstrated 7 mm right upper lobe nodule, no significant lymphadenopathy, emphysema, umbilical hernia, small ventral hernia.     Currently using  Resmed auto CPAP @16/8/4 cm H20 pressure; compliance reviewed for 1/30/2025 through 3/30/2025, days used 59/60, average daily usage 10 hours 35 minutes, 98% of days greater than or equal to 4 hours, mask leak at 16 LPM at 95th percentile, AHI 1.7 per hour.  Patient is on 3 L O2 bleed in.  See media for full report.    Device obtained 8/24/2023  DME provider [facility] in [place]  Mask interface fullface mask memory foam    Polysomnogram obtained 4/11/2023 demonstrating overall AHI of 12.78 events per hour increasing while supine to " 37.28 events per hour.  Low O2 sat of 67 with sats less than or equal to 88 for 371 minutes of total sleep time.    Sleep schedule goes to bed 10-11 p.m., wakens 8 9 AM , and gets up during the night bathroom x 3-5, patient reports awakening every 1-3 hours.   Symptoms experiences daytime somnolence , insomnia, and fatigue    [place] Sleepiness Scale reported as 3/24 on 3/22/2023      Review of Systems   Constitutional:  Positive for malaise/fatigue (severe). Negative for chills, fever and weight loss.   HENT:  Positive for tinnitus (intermittent). Negative for congestion, hearing loss, nosebleeds, sinus pain and sore throat.    Eyes:         Presc glasses   Respiratory:  Positive for shortness of breath and wheezing. Negative for cough and sputum production.         COPD   Cardiovascular:  Negative for chest pain, palpitations, orthopnea and leg swelling.   Gastrointestinal:  Negative for heartburn.        Upper and lower dentures, no difficulty swallowing    Neurological:  Positive for dizziness (vertigo) and headaches (first thing in am and later in day). Negative for tremors.   Psychiatric/Behavioral:  The patient has insomnia (difficulty falling asleep upt to 1-3 hours).        Past Medical History:   Diagnosis Date    Asthma     Back pain     Bronchitis     COPD (chronic obstructive pulmonary disease) (HCC)     Earache     Frequent urination     Pneumonia     Restless leg syndrome     Ringing in ears     Shortness of breath     Wears glasses     Wheezing        Past Surgical History:   Procedure Laterality Date    CHOLECYSTECTOMY      PRIMARY C SECTION         Family History   Problem Relation Age of Onset    Asthma Mother     Cancer Mother     Asthma Father     Cancer Father     Asthma Maternal Uncle        Social History     Socioeconomic History    Marital status: Single     Spouse name: Not on file    Number of children: Not on file    Years of education: Not on file    Highest education level: Not on file    Occupational History    Not on file   Tobacco Use    Smoking status: Former     Current packs/day: 0.00     Average packs/day: 0.5 packs/day for 37.0 years (18.5 ttl pk-yrs)     Types: Cigarettes     Start date: 1979     Quit date: 2016     Years since quittin.2     Passive exposure: Past    Smokeless tobacco: Never   Vaping Use    Vaping status: Never Used   Substance and Sexual Activity    Alcohol use: Not Currently     Alcohol/week: 3.6 oz     Types: 6 Standard drinks or equivalent per week     Comment: I quit drinking 2022    Drug use: Not Currently     Types: Methamphetamines    Sexual activity: Not on file   Other Topics Concern    Not on file   Social History Narrative    Not on file     Social Drivers of Health     Financial Resource Strain: Not on file   Food Insecurity: Not on file   Transportation Needs: Not on file   Physical Activity: Not on file   Stress: Not on file   Social Connections: Not on file   Intimate Partner Violence: Not on file   Housing Stability: Not on file       Allergies as of 2025 - Reviewed 2025   Allergen Reaction Noted    Aspirin  2022    Doxycycline  2022    Duloxetine  2022          Current medications as of today   Current Outpatient Medications   Medication Sig Dispense Refill    NEURONTIN 300 MG Cap       OZEMPIC, 0.25 OR 0.5 MG/DOSE, 2 MG/3ML Solution Pen-injector       SKYRIZI 180 MG/1.2ML Solution Cartridge       fluticasone-umeclidinium-vilanterol (TRELEGY ELLIPTA) 200-62.5-25 mcg/act inhaler Inhale 1 Puff every day. 3 Each 3    VENTOLIN  (90 Base) MCG/ACT Aero Soln inhalation aerosol INHALE 1 PUFF BY MOUTH EVERY 4 HOURS AS NEEDED FOR WHEEZING 8.5 g 6    VEOZAH 45 MG Tab       ipratropium-albuterol (DUONEB) 0.5-2.5 (3) MG/3ML nebulizer solution Take 3 mL by nebulization every four hours as needed for Shortness of Breath. 120 mL 11    Meclizine HCl 25 MG Chew Tab TAKE ONE TABLET BY MOUTH 3-TIMES A DAY AS NEEDED  FOR NAUSEA & VOMITING      ROPINIRole (REQUIP) 1 MG Tab Take 1 mg by mouth every evening.      montelukast (SINGULAIR) 10 MG Tab TAKE 1 TABLET BY MOUTH EVERY EVENING 30 Tablet 11     No current facility-administered medications for this visit.         Physical Exam:   Gen:           Alert and oriented, No apparent distress. Mood and affect appropriate, normal interaction with examiner.   Hearing:     Grossly intact.  Nose:          Normal, no lesions or deformities.  Dentition:    Upper and lower dentures  Oropharynx:   Tongue normal, posterior pharynx without erythema or exudate.  Mallampati Classification: IV  Neck:        Supple, trachea midline, no masses.  Respiratory Effort: No intercostal retractions or use of accessory muscles.   Gait and Station: Grossly normal.  Digits and Nails: No clubbing, cyanosis, petechiae, or nodes.   Skin:        No rashes, lesions or ulcers noted.               Ext:           No cyanosis or edema.      Immunizations:  Flu: Annual recommended  PCV 20: 3/15/2023  SARS CoV2 Vaccine: Recommended    Assessment / Plan:  1. Insomnia, unspecified type  - ALPRAZolam (XANAX) 0.25 MG Tab; Take 1 Tablet by mouth at bedtime as needed for Sleep for up to 14 doses.  Dispense: 14 Tablet; Refill: 0    Patient previously on antidepressants, follow-up with primary considering potential benefit.  Small amount of sleep aid provided.  May benefit from treatment for restless legs.    2. JOSÉ MIGUEL (obstructive sleep apnea)    Patient had been struggling with CPAP but benefiting from new air touch memory foam fullface mask.  Reviewed compliance patient is demonstrating consistent use and benefit.  Current mask and supply order.  Equipment cleaning and use of distilled water briefly touched upon.  Follow-up in 6 months.    3. Nocturnal hypoxia    Nocturnal oxygen use at 3 L bleed with stated benefit.      Follow-up:   Return in about 6 months (around 9/30/2025) for Return with Nicole Duvall PA-C.    Please note  that this dictation was created using voice recognition software. I have made every reasonable attempt to correct obvious errors, but it is possible there are errors of grammar and possibly content that I did not discover before finalizing the note.

## 2025-03-31 NOTE — PATIENT INSTRUCTIONS
1-reviewed compliance which is excellent  2-demonstrating use and benefit  3-new mask is air touch memory foam   4-ongoing sleep onset issues  5-trial extended release melatonin worsened restless leg symptoms  6-small amount of alprazolam provided   7-previously on antidepressant, follow up with primary regarding assessment for ongoing need  8-follow up in 6 months to reassess

## 2025-04-08 ENCOUNTER — HOSPITAL ENCOUNTER (OUTPATIENT)
Dept: RADIOLOGY | Facility: MEDICAL CENTER | Age: 60
End: 2025-04-08
Attending: STUDENT IN AN ORGANIZED HEALTH CARE EDUCATION/TRAINING PROGRAM
Payer: COMMERCIAL

## 2025-04-08 ENCOUNTER — OFFICE VISIT (OUTPATIENT)
Dept: SLEEP MEDICINE | Facility: MEDICAL CENTER | Age: 60
End: 2025-04-08
Attending: INTERNAL MEDICINE
Payer: COMMERCIAL

## 2025-04-08 VITALS
DIASTOLIC BLOOD PRESSURE: 56 MMHG | SYSTOLIC BLOOD PRESSURE: 112 MMHG | OXYGEN SATURATION: 93 % | BODY MASS INDEX: 43.23 KG/M2 | HEART RATE: 93 BPM | HEIGHT: 63 IN | WEIGHT: 244 LBS

## 2025-04-08 DIAGNOSIS — J44.89 COPD WITH ASTHMA (HCC): ICD-10-CM

## 2025-04-08 DIAGNOSIS — R91.1 LUNG NODULE: ICD-10-CM

## 2025-04-08 DIAGNOSIS — Z87.891 FORMER SMOKER: ICD-10-CM

## 2025-04-08 DIAGNOSIS — J96.11 CHRONIC RESPIRATORY FAILURE WITH HYPOXIA (HCC): ICD-10-CM

## 2025-04-08 DIAGNOSIS — R91.1 PULMONARY NODULE: ICD-10-CM

## 2025-04-08 DIAGNOSIS — G47.00 INSOMNIA, UNSPECIFIED TYPE: ICD-10-CM

## 2025-04-08 PROCEDURE — 3078F DIAST BP <80 MM HG: CPT | Performed by: INTERNAL MEDICINE

## 2025-04-08 PROCEDURE — 99214 OFFICE O/P EST MOD 30 MIN: CPT | Performed by: INTERNAL MEDICINE

## 2025-04-08 PROCEDURE — 71250 CT THORAX DX C-: CPT

## 2025-04-08 PROCEDURE — 3074F SYST BP LT 130 MM HG: CPT | Performed by: INTERNAL MEDICINE

## 2025-04-08 PROCEDURE — 99211 OFF/OP EST MAY X REQ PHY/QHP: CPT | Performed by: INTERNAL MEDICINE

## 2025-04-08 RX ORDER — IPRATROPIUM BROMIDE AND ALBUTEROL SULFATE 2.5; .5 MG/3ML; MG/3ML
3 SOLUTION RESPIRATORY (INHALATION) EVERY 4 HOURS PRN
Qty: 120 ML | Refills: 11 | Status: SHIPPED | OUTPATIENT
Start: 2025-04-08

## 2025-04-08 ASSESSMENT — FIBROSIS 4 INDEX: FIB4 SCORE: 0.7

## 2025-04-08 ASSESSMENT — ENCOUNTER SYMPTOMS
NAUSEA: 0
FOCAL WEAKNESS: 0
DIAPHORESIS: 0
CHILLS: 0
EYE REDNESS: 0
COUGH: 0
SORE THROAT: 0
PND: 0
VOMITING: 0
WHEEZING: 0
HEADACHES: 0
DOUBLE VISION: 0
NECK PAIN: 0
PHOTOPHOBIA: 0
FEVER: 0
FALLS: 0
HEARTBURN: 1
CLAUDICATION: 0
WEIGHT LOSS: 0
EYE DISCHARGE: 0
SINUS PAIN: 0
SPEECH CHANGE: 0
EYE PAIN: 0
MYALGIAS: 0
CONSTIPATION: 0
TREMORS: 0
ABDOMINAL PAIN: 0
DIARRHEA: 0
DEPRESSION: 0
DIZZINESS: 0
PALPITATIONS: 0
STRIDOR: 0
ORTHOPNEA: 0
SHORTNESS OF BREATH: 1
SPUTUM PRODUCTION: 0
WEAKNESS: 0
BLURRED VISION: 0
HEMOPTYSIS: 0
BACK PAIN: 0

## 2025-04-08 NOTE — PROGRESS NOTES
Chief Complaint   Patient presents with    Follow-Up     LAST SEEN 9/18/24    Results     CT CHEST 11/12/24  CT CHEST ON 4/8/24 @ 4:20PM   LABS 9/18/24         HPI: This patient is a 60 y.o. female whom is followed in our clinic for obstructive lung disease c/b respiratory failure last seen by me on 9/18/24. PMHx is significant for UC not currently on any therapy and hx of bowel perforation, asthma in her 22 with environmental allergies reportedly worse in [place] than [place]. She is a former smoker with estimated 20 pk year tobacco hx, tobacco free since 2016. She was dx with COPD roughly 6 years ago and was on Symbicort and Spiriva at the time of my initial visit with her. She was hospitalized in [place] in 2022 and weaned off supplemental oxygen but had poor control of wheezing and shortness of breath at the time of our first visit using albuterol 3-4 times daily. We transitioned her to Trelegy 200 and she has not had need for prednisone, urgent care visit or hospitalization for respiratory issues since then. PFT from  September 2023 showed FEV1/FVC ratio 61 and FEV1 postbronchodilator 1.41 L or 56% predicted. No BD responsiveness by ATS criteria however she has significant response in the mid flows. TLC was normal but she did have air trapping and DLCO is just below lower limits of normal at 77% predicted. Updated PFT 9/2024 was stable. TTE from [facility] in  6/24 was normal and she had LHC since our last visit due to abnormal stress test but no obstructive CAD seen. CT chest from 8/2024 showed moderate emphysema and 7 mm RUL nodule. Nodule increased in size on a f/u CT from 11/2024 and we referred her to thoracic surgery. She saw DR Adan and PET was ordered and done in January which showed decrease in nodule size. Repeat imaging is scheduled for today. She is currently on prednisone and abx for colitis. No exacerbations of obstructive lung disease however she notes increase in SOB and chest tightness over the past 2 days  w/o associated cough, wheezing. No f/c. She also notes increase in GERD sxs over the past 2-3 days. She is also having a hard time with powdered inhalent with trelegy worsening cough. She is also troubled by recent insomnia, prescribed low dose Xanax by sleep medicine.     Past Medical History:   Diagnosis Date    Asthma     Back pain     Bronchitis     COPD (chronic obstructive pulmonary disease) ([facility])     Earache     Frequent urination     Pneumonia     Restless leg syndrome     Ringing in ears     Shortness of breath     Wears glasses     Wheezing        Social History     Socioeconomic History    Marital status: Single     Spouse name: Not on file    Number of children: Not on file    Years of education: Not on file    Highest education level: Not on file   Occupational History    Not on file   Tobacco Use    Smoking status: Former     Current packs/day: 0.00     Average packs/day: 0.5 packs/day for 37.0 years (18.5 ttl pk-yrs)     Types: Cigarettes     Start date: 1979     Quit date: 2016     Years since quittin.2     Passive exposure: Past    Smokeless tobacco: Never   Vaping Use    Vaping status: Never Used   Substance and Sexual Activity    Alcohol use: Not Currently     Alcohol/week: 3.6 oz     Types: 6 Standard drinks or equivalent per week     Comment: I quit drinking 2022    Drug use: Not Currently     Types: Methamphetamines    Sexual activity: Not on file   Other Topics Concern    Not on file   Social History Narrative    Not on file     Social Drivers of Health     Financial Resource Strain: Not on file   Food Insecurity: Not on file   Transportation Needs: Not on file   Physical Activity: Not on file   Stress: Not on file   Social Connections: Not on file   Intimate Partner Violence: Not on file   Housing Stability: Not on file       Family History   Problem Relation Age of Onset    Asthma Mother     Cancer Mother     Asthma Father     Cancer Father     Asthma Maternal Uncle         Current Outpatient Medications on File Prior to Visit   Medication Sig Dispense Refill    NEURONTIN 300 MG Cap       OZEMPIC, 0.25 OR 0.5 MG/DOSE, 2 MG/3ML Solution Pen-injector       SKYRIZI 180 MG/1.2ML Solution Cartridge       ALPRAZolam (XANAX) 0.25 MG Tab Take 1 Tablet by mouth at bedtime as needed for Sleep for up to 14 doses. 14 Tablet 0    VENTOLIN  (90 Base) MCG/ACT Aero Soln inhalation aerosol INHALE 1 PUFF BY MOUTH EVERY 4 HOURS AS NEEDED FOR WHEEZING 8.5 g 6    VEOZAH 45 MG Tab       Meclizine HCl 25 MG Chew Tab TAKE ONE TABLET BY MOUTH 3-TIMES A DAY AS NEEDED FOR NAUSEA & VOMITING      ROPINIRole (REQUIP) 1 MG Tab Take 1 mg by mouth every evening.       No current facility-administered medications on file prior to visit.       Aspirin, Doxycycline, and Duloxetine      ROS:   Review of Systems   Constitutional:  Negative for chills, diaphoresis, fever, malaise/fatigue and weight loss.   HENT:  Negative for congestion, ear discharge, ear pain, hearing loss, nosebleeds, sinus pain, sore throat and tinnitus.    Eyes:  Negative for blurred vision, double vision, photophobia, pain, discharge and redness.   Respiratory:  Positive for shortness of breath. Negative for cough, hemoptysis, sputum production, wheezing and stridor.    Cardiovascular:  Negative for chest pain, palpitations, orthopnea, claudication, leg swelling and PND.   Gastrointestinal:  Positive for heartburn. Negative for abdominal pain, constipation, diarrhea, nausea and vomiting.   Genitourinary:  Negative for dysuria and urgency.   Musculoskeletal:  Negative for back pain, falls, joint pain, myalgias and neck pain.   Skin:  Negative for itching and rash.   Neurological:  Negative for dizziness, tremors, speech change, focal weakness, weakness and headaches.   Endo/Heme/Allergies:  Negative for environmental allergies.   Psychiatric/Behavioral:  Negative for depression.        /56 (BP Location: Right arm, Patient Position:  "Sitting, BP Cuff Size: Adult)   Pulse 93   Ht 1.6 m (5' 3\")   Wt 111 kg (244 lb)   SpO2 93%   Physical Exam  Constitutional:       General: She is not in acute distress.     Appearance: Normal appearance. She is well-developed and normal weight.   HENT:      Head: Normocephalic and atraumatic.      Right Ear: External ear normal.      Left Ear: External ear normal.      Nose: Nose normal. No congestion.      Mouth/Throat:      Mouth: Mucous membranes are moist.      Pharynx: Oropharynx is clear. No oropharyngeal exudate.   Eyes:      General: No scleral icterus.     Extraocular Movements: Extraocular movements intact.      Conjunctiva/sclera: Conjunctivae normal.      Pupils: Pupils are equal, round, and reactive to light.   Neck:      Vascular: No JVD.      Trachea: No tracheal deviation.   Cardiovascular:      Rate and Rhythm: Normal rate and regular rhythm.      Heart sounds: Normal heart sounds. No murmur heard.     No friction rub. No gallop.   Pulmonary:      Effort: Pulmonary effort is normal. No accessory muscle usage or respiratory distress.      Breath sounds: Normal breath sounds. No wheezing or rales.   Abdominal:      General: There is no distension.      Palpations: Abdomen is soft.      Tenderness: There is no abdominal tenderness.   Musculoskeletal:         General: No tenderness or deformity. Normal range of motion.      Cervical back: Normal range of motion and neck supple.      Right lower leg: No edema.      Left lower leg: No edema.   Lymphadenopathy:      Cervical: No cervical adenopathy.   Skin:     General: Skin is warm and dry.      Findings: No rash.      Nails: There is no clubbing.   Neurological:      Mental Status: She is alert and oriented to person, place, and time.      Cranial Nerves: No cranial nerve deficit.      Gait: Gait normal.   Psychiatric:         Behavior: Behavior normal.         PFTs as reviewed by me personally: as per hPI    Imaging as reviewed by me personally:  " as per HPI    Assessment:  1. COPD with asthma (HCC)  Budeson-Glycopyrrol-Formoterol (BREZTRI AEROSPHERE) 160-9-4.8 MCG/ACT Aerosol    ipratropium-albuterol (DUONEB) 0.5-2.5 (3) MG/3ML nebulizer solution      2. Lung nodule        3. Chronic respiratory failure with hypoxia (HCC)        4. Insomnia, unspecified type        5. Former smoker            Plan:  Chronic. Stable. Not clearly in exacerbation today but suspect she may have increase in SOB due to GERD. Will transition to breztri given difficulty tolerating DPI/powder with trelegy. Tobacco free.  Has decreased in size. Can f/u with our clinic after today's CT Ordered by Dr. Adan  Chronic and 2/2 obstructive lung disease. Stable. Pt in compliant with and benefiting from O2 at 2lPM  Seeing sleep medicine.   Tobacco free since 2016  Return in about 4 months (around 8/8/2025).

## 2025-05-01 ENCOUNTER — TELEPHONE (OUTPATIENT)
Dept: SLEEP MEDICINE | Facility: MEDICAL CENTER | Age: 60
End: 2025-05-01
Payer: COMMERCIAL

## 2025-05-01 NOTE — TELEPHONE ENCOUNTER
FAX:requesting records and sxclearnace.   Called and spoke with Shannon Prince. Requested a form to be faxed.    1.Name: Mila Acuña                 Call Back Number: There are no phone numbers on file.      Patient approves a detailed voicemail message: N\A    2.  What is the procedure:     3.  When is the procedure scheduled: TBD    4.  Who [facility] the Surgeon: [facility]    5. Has the patient completed any screening tests for the procedure:     6. Has PCP received a written request from Surgeon: no-requested    7. Patient scheduled for an appointment for this clearance?:  no    8. Last OV: 04/08/25 with Dr Ma     9. Next OV: 05/07/25 with Omid EMERSON     10. Last CXR: None    11. Last PFT: 09/18/24    12. Last CT chest 04/08/25    Current Medications  Current Outpatient Medications   Medication Sig Dispense Refill    Budeson-Glycopyrrol-Formoterol (BREZTRI AEROSPHERE) 160-9-4.8 MCG/ACT Aerosol Inhale 2 Puffs 2 times a day. 10.7 g 11    ipratropium-albuterol (DUONEB) 0.5-2.5 (3) MG/3ML nebulizer solution Take 3 mL by nebulization every four hours as needed for Shortness of Breath. 120 mL 11    NEURONTIN 300 MG Cap       OZEMPIC, 0.25 OR 0.5 MG/DOSE, 2 MG/3ML Solution Pen-injector       SKYRIZI 180 MG/1.2ML Solution Cartridge       VENTOLIN  (90 Base) MCG/ACT Aero Soln inhalation aerosol INHALE 1 PUFF BY MOUTH EVERY 4 HOURS AS NEEDED FOR WHEEZING 8.5 g 6    VEOZAH 45 MG Tab       Meclizine HCl 25 MG Chew Tab TAKE ONE TABLET BY MOUTH 3-TIMES A DAY AS NEEDED FOR NAUSEA & VOMITING      ROPINIRole (REQUIP) 1 MG Tab Take 1 mg by mouth every evening.       No current facility-administered medications for this visit.       Please advise.

## 2025-05-07 ENCOUNTER — OFFICE VISIT (OUTPATIENT)
Dept: SLEEP MEDICINE | Facility: MEDICAL CENTER | Age: 60
End: 2025-05-07
Payer: COMMERCIAL

## 2025-05-07 VITALS
WEIGHT: 257 LBS | BODY MASS INDEX: 45.54 KG/M2 | HEART RATE: 97 BPM | DIASTOLIC BLOOD PRESSURE: 70 MMHG | SYSTOLIC BLOOD PRESSURE: 126 MMHG | OXYGEN SATURATION: 95 % | RESPIRATION RATE: 16 BRPM | HEIGHT: 63 IN

## 2025-05-07 DIAGNOSIS — J44.89 COPD WITH ASTHMA (HCC): Primary | ICD-10-CM

## 2025-05-07 DIAGNOSIS — J96.11 CHRONIC HYPOXIC RESPIRATORY FAILURE (HCC): ICD-10-CM

## 2025-05-07 PROCEDURE — 99215 OFFICE O/P EST HI 40 MIN: CPT

## 2025-05-07 RX ORDER — LEVOFLOXACIN 750 MG/1
750 TABLET, FILM COATED ORAL DAILY
Qty: 7 TABLET | Refills: 0 | Status: SHIPPED | OUTPATIENT
Start: 2025-05-07

## 2025-05-07 RX ORDER — GUAIFENESIN 1200 MG/1
1 TABLET, EXTENDED RELEASE ORAL 2 TIMES DAILY PRN
Qty: 28 TABLET | Refills: 1 | Status: SHIPPED | OUTPATIENT
Start: 2025-05-07

## 2025-05-07 ASSESSMENT — ENCOUNTER SYMPTOMS
DEPRESSION: 0
PALPITATIONS: 0
STRIDOR: 0
DIZZINESS: 0
SPUTUM PRODUCTION: 1
SHORTNESS OF BREATH: 1
WEIGHT LOSS: 0
COUGH: 1
HEARTBURN: 0
HEMOPTYSIS: 0
CHILLS: 0
FEVER: 0
WHEEZING: 1

## 2025-05-07 ASSESSMENT — FIBROSIS 4 INDEX: FIB4 SCORE: 0.7

## 2025-05-07 NOTE — PROGRESS NOTES
Pulmonary Clinic follow up    Date of Service: 5/7/2025    Reason for follow up:  COPD (Last Seen 04/08/2025 with Dr. Ma//4L 27/4)    History of Present Illness:     Mila Acuña is a 60 y.o. female being evaluated in clinic today for f/u on COPD w/ asthma c/b chronic hypoxic respiratory failure. PMH includes ulcerative colitis, hx of bowel perforation, seasonal allergies. Patient is a 20 pack year smoker, quit in 2016. Patient was diagnosed with COPD approximately 2018. Patient was hospitalized in 2022 but has not required urgent care, prednisone, or emergency care since being started on Trelegy 200. CT chest from 8/2024 showed moderate emphysema and 7 mm RUL nodule. Nodule increased in size on a f/u CT from 11/2024 and we referred her to thoracic surgery. She saw DR Adan and PET was ordered and done in January 2025 which showed decrease in nodule size. CT chest follow up in April 2025 showed a resolution of the RUL nodule which favored an inflammatory process, some residual scarring noted. Patient presents after she was started on Bactrim DS for UC flare with prednisone on 4/1/2025 and stated she had significant shortness of breath and later in the month went to an ambulatory care and was prescribed Augmentin. Patient is now down to 20mg prednisone daily for the UC but there is some concern that her respiratory symptoms seem triggered by Bactrim although she did have a CT without any concerns of lung injury on 4/7/2025. Patient reports her mucus is thick and green, she denies fever or chills but does have hot flashes from menopause so she is unsure if she has had a fever.     MMRC Grade:   0- Breathless only during strenuous exercise  1- Short of breath when hurrying or going up a small hill  2- Walks slower than friends due to breathlessness, has to stop at own pace  3- Stops to catch breath on level ground after 100m  4- Breathless with ambulating around house or ADLs     Review of Systems    Constitutional:  Negative for chills, fever and weight loss.   Respiratory:  Positive for cough, sputum production, shortness of breath and wheezing. Negative for hemoptysis and stridor.    Cardiovascular:  Negative for chest pain, palpitations and leg swelling.   Gastrointestinal:  Negative for heartburn.   Neurological:  Negative for dizziness.   Endo/Heme/Allergies:  Positive for environmental allergies.   Psychiatric/Behavioral:  Negative for depression.      Current Outpatient Medications on File Prior to Visit   Medication Sig Dispense Refill    Budeson-Glycopyrrol-Formoterol (BREZTRI AEROSPHERE) 160-9-4.8 MCG/ACT Aerosol Inhale 2 Puffs 2 times a day. 10.7 g 11    ipratropium-albuterol (DUONEB) 0.5-2.5 (3) MG/3ML nebulizer solution Take 3 mL by nebulization every four hours as needed for Shortness of Breath. 120 mL 11    NEURONTIN 300 MG Cap       SKYRIZI 180 MG/1.2ML Solution Cartridge       VENTOLIN  (90 Base) MCG/ACT Aero Soln inhalation aerosol INHALE 1 PUFF BY MOUTH EVERY 4 HOURS AS NEEDED FOR WHEEZING 8.5 g 6    VEOZAH 45 MG Tab       ROPINIRole (REQUIP) 1 MG Tab Take 1 mg by mouth every evening.      OZEMPIC, 0.25 OR 0.5 MG/DOSE, 2 MG/3ML Solution Pen-injector       Meclizine HCl 25 MG Chew Tab TAKE ONE TABLET BY MOUTH 3-TIMES A DAY AS NEEDED FOR NAUSEA & VOMITING       No current facility-administered medications on file prior to visit.     Social History     Tobacco Use    Smoking status: Former     Current packs/day: 0.00     Average packs/day: 0.5 packs/day for 37.0 years (18.5 ttl pk-yrs)     Types: Cigarettes     Start date: 1979     Quit date: 2016     Years since quittin.3     Passive exposure: Past    Smokeless tobacco: Never   Vaping Use    Vaping status: Never Used   Substance Use Topics    Alcohol use: Not Currently     Alcohol/week: 3.6 oz     Types: 6 Standard drinks or equivalent per week     Comment: I quit drinking 2022    Drug use: Not Currently      "Types: Methamphetamines      Past Medical History:   Diagnosis Date    Asthma     Back pain     Bronchitis     COPD (chronic obstructive pulmonary disease) (HCC)     Earache     Frequent urination     Pneumonia     Restless leg syndrome     Ringing in ears     Shortness of breath     Wears glasses     Wheezing      Past Surgical History:   Procedure Laterality Date    CHOLECYSTECTOMY      PRIMARY C SECTION       Aspirin, Doxycycline, and Duloxetine    Family History   Problem Relation Age of Onset    Asthma Mother     Cancer Mother     Asthma Father     Cancer Father     Asthma Maternal Uncle      /70 (BP Location: Right arm, Patient Position: Sitting, BP Cuff Size: Adult)   Pulse 97   Resp 16   Ht 1.6 m (5' 3\")   Wt 117 kg (257 lb)   SpO2 95%      Physical Exam  Constitutional:       General: She is not in acute distress.  HENT:      Head: Normocephalic.      Nose: Nose normal.      Mouth/Throat:      Mouth: Mucous membranes are moist.   Eyes:      Conjunctiva/sclera: Conjunctivae normal.   Cardiovascular:      Rate and Rhythm: Normal rate and regular rhythm.      Heart sounds: No murmur heard.  Pulmonary:      Effort: Prolonged expiration present. No respiratory distress.      Breath sounds: Wheezing and rhonchi present.   Abdominal:      General: There is no distension.   Musculoskeletal:      Right lower leg: No edema.      Left lower leg: No edema.   Skin:     General: Skin is warm and dry.      Capillary Refill: Capillary refill takes less than 2 seconds.      Nails: There is no clubbing.   Neurological:      General: No focal deficit present.      Mental Status: She is alert and oriented to person, place, and time.   Psychiatric:         Mood and Affect: Mood normal.       Results:    PFT 9/18/2024:    Interpretation;   Baseline spirometry shows airflow obstruction with an FEV1/FVC ratio 64 and an FEV1 of 1.43 L or 57% predicted.  No significant bronchodilator response.  Total lung capacity is " normal however there is evidence for air trapping with residual volume of 168% predicted.  Diffusion capacity is low normal at 81% predicted.  Pulmonary function testing shows fixed airflow obstruction with mild air trapping and low normal DLCO consistent with chronic obstructive pulmonary disease.    CT chest 4/8/2025:    Right lung:  Previously seen posterior right upper lobe nodule measuring 10.57 mm has now almost completely resolved with just a slightly irregular curvilinear focus of scarring (axial image 68, series 4 from prior exam, compared with axial image 67, series 2 from today's exam). Findings consistent with resolving inflammatory process.     In the subpleural anterior right lower lobe at the lung bases, again seen is a tiny 5 mm stellate lung lesion which is unchanged from prior exam (axial image 111, series 2 from today's exam, axial image 116, series 2 from 11/12/2024).     Left lung:  Curvilinear stranding in the lingula consistent with scarring or subsegmental atelectatic change.     No dominant mass, moriah consolidation, or suspicious pulmonary nodule.     There is no effusion or pneumothorax.    Vaccinations:    Covid: refused by patient   Flu: due, encouraged to receive   Pneumonia: complete   RSV: due, encouraged to receive   Assessment & Plan  COPD with asthma (HCC)    Chronic, severe, currently exacerbated. Patient is managed on Breztri + Duoneb + Albuterol prn and is oxygen dependent. Patient was seen in walk-in clinic and prescribed augmentin and patient is already on prednisone for her UC.     She reports thick green mucus and could benefit from Levaquin d/t risk of pseudomonas and the severity of her COPD.     Emphasized airway clearance w/ flutter valve and mucinex     Consider CT chest + sputum culture if symptoms are persistent and she doesn't respond to Levaquin.     Orders:    RSV Beyfortus 1 mL    DME Flutter Valve    Guaifenesin 1200 MG TABLET SR 12 HR; Take 1 Tablet by mouth 2  times a day as needed (congestion).    levoFLOXacin (LEVAQUIN) 750 MG tablet; Take 1 Tablet by mouth every day. Take until gone.    Chronic hypoxic respiratory failure (HCC)    Oxygen at 4 LPM continuous  Chronic hypoxia 2/2 COPD  Patient is compliant with treatment.   Patient is benefiting from treatment.          Return if symptoms worsen or fail to improve, for f/u as scheduled with Dr. Ma .     This note was generated using voice recognition software which has a chance of producing errors of grammar and possibly content.  I have made every reasonable attempt to find and correct any obvious errors, but it should be expected that some may not be found prior to finalization of this note.    Time spent in record review prior to patient arrival, reviewing results, and in face-to-face encounter totaled 41 min, excluding any procedures if performed.    Omid EMERSON  [facility]

## 2025-05-07 NOTE — ASSESSMENT & PLAN NOTE
Oxygen at 4 LPM continuous  Chronic hypoxia 2/2 COPD  Patient is compliant with treatment.   Patient is benefiting from treatment.

## 2025-05-09 ENCOUNTER — TELEPHONE (OUTPATIENT)
Dept: SLEEP MEDICINE | Facility: MEDICAL CENTER | Age: 60
End: 2025-05-09
Payer: COMMERCIAL

## 2025-05-09 NOTE — TELEPHONE ENCOUNTER
Patient left a message requesting where her flutter valve was sent to. States she has not heard from anyone in our office regarding the process of the flutter valve.

## 2025-05-14 NOTE — TELEPHONE ENCOUNTER
Order sent to [facility] on DME: Juanito TERAN / ph [phone] / fx [phone]

## 2025-05-16 NOTE — TELEPHONE ENCOUNTER
Called and spoke with Shannon with [facility]. They're surgery team wants pt to do an updated PFT test prior.   She will re-fax the request for this on Monday

## 2025-05-18 DIAGNOSIS — J44.89 COPD WITH ASTHMA (HCC): Primary | ICD-10-CM

## 2025-05-19 NOTE — Clinical Note
REFERRAL APPROVAL NOTICE         Sent on May 19, 2025                   Mila Acuña  [address]  [place] [address]                   Dear Ms. Acuña,    After a careful review of the medical information and benefit coverage, [facility] has processed your referral. See below for additional details.    If applicable, you must be actively enrolled with your insurance for coverage of the authorized service. If you have any questions regarding your coverage, please contact your insurance directly.    REFERRAL INFORMATION   Referral #:  [id number]  Referred-To Department    Referred-By Provider:  Pulmonary and Sleep Medicine    NERY Clayton   Pulmonary/sleep [facility]      [address]  [address]  [place] [address]  [phone] [address], [address]  [place] [address] [phone]    Referral Start Date:  05/18/2025  Referral End Date:   05/18/2026           SCHEDULING  If you do not already have an appointment, please call [phone] to make an appointment.   MORE INFORMATION  As a reminder, [facility] ownership has changed, meaning this location is now owned and operated by [facility]. As such, we want to clarify that our patients should expect to receive two separate bills for the services received at [facility] - one representing the [facility] facility fees as the owner of the establishment, and the other to represent the physician's services and subsequent fees. You can speak with your insurance carrier for a pricing estimate by calling the customer service number on the back of your card and ask about charges for a hospital outpatient visit.  If you do not already have a [employer] account, sign up at: [email or web address]  You can access your medical information, make appointments, see lab results, billing  information, and more.  If you have questions regarding this referral, please contact  the [facility] department at:             [phone]. Monday - Friday 7:30AM - 5:00PM.      Sincerely,  [facility]

## 2025-05-19 NOTE — TELEPHONE ENCOUNTER
Called and spoke with pt. Informed pt of this. Scheduled pt a PFT on 07/3/25. Provided [facility] PFT phone number to see if they have something sooner for pt.     Pending incoming fax for clearance request.

## 2025-06-25 ENCOUNTER — HOSPITAL ENCOUNTER (OUTPATIENT)
Dept: LAB | Facility: MEDICAL CENTER | Age: 60
End: 2025-06-25
Attending: NURSE PRACTITIONER
Payer: COMMERCIAL

## 2025-06-25 PROCEDURE — 36415 COLL VENOUS BLD VENIPUNCTURE: CPT

## 2025-06-25 PROCEDURE — 86480 TB TEST CELL IMMUN MEASURE: CPT

## 2025-06-26 ENCOUNTER — HOSPITAL ENCOUNTER (OUTPATIENT)
Dept: RADIOLOGY | Facility: MEDICAL CENTER | Age: 60
End: 2025-06-26
Attending: STUDENT IN AN ORGANIZED HEALTH CARE EDUCATION/TRAINING PROGRAM
Payer: COMMERCIAL

## 2025-06-26 DIAGNOSIS — R91.1 COIN LESION: ICD-10-CM

## 2025-06-26 LAB
GAMMA INTERFERON BACKGROUND BLD IA-ACNC: 0.02 IU/ML
M TB IFN-G BLD-IMP: NEGATIVE
M TB IFN-G CD4+ BCKGRND COR BLD-ACNC: 0 IU/ML
MITOGEN IGNF BCKGRD COR BLD-ACNC: >10 IU/ML
QFT TB2 - NIL TBQ2: 0 IU/ML

## 2025-06-26 PROCEDURE — 71250 CT THORAX DX C-: CPT

## 2025-06-30 ENCOUNTER — OFFICE VISIT (OUTPATIENT)
Dept: SLEEP MEDICINE | Facility: MEDICAL CENTER | Age: 60
End: 2025-06-30
Payer: COMMERCIAL

## 2025-06-30 VITALS
HEART RATE: 78 BPM | OXYGEN SATURATION: 95 % | HEIGHT: 63 IN | SYSTOLIC BLOOD PRESSURE: 100 MMHG | DIASTOLIC BLOOD PRESSURE: 62 MMHG | BODY MASS INDEX: 44.12 KG/M2 | WEIGHT: 249 LBS

## 2025-06-30 DIAGNOSIS — R91.1 LUNG NODULE: ICD-10-CM

## 2025-06-30 DIAGNOSIS — J44.89 COPD WITH ASTHMA (HCC): Primary | ICD-10-CM

## 2025-06-30 DIAGNOSIS — J96.11 CHRONIC HYPOXIC RESPIRATORY FAILURE (HCC): ICD-10-CM

## 2025-06-30 PROCEDURE — 99213 OFFICE O/P EST LOW 20 MIN: CPT

## 2025-06-30 RX ORDER — PREDNISONE 10 MG/1
TABLET ORAL
Qty: 18 TABLET | Refills: 1 | Status: SHIPPED | OUTPATIENT
Start: 2025-06-30

## 2025-06-30 RX ORDER — AZITHROMYCIN 250 MG/1
TABLET, FILM COATED ORAL
Qty: 6 TABLET | Refills: 1 | Status: SHIPPED | OUTPATIENT
Start: 2025-06-30

## 2025-06-30 ASSESSMENT — ENCOUNTER SYMPTOMS
DIZZINESS: 0
HEARTBURN: 0
SHORTNESS OF BREATH: 1
CHILLS: 0
STRIDOR: 0
COUGH: 1
WEIGHT LOSS: 0
DEPRESSION: 0
WHEEZING: 0
HEMOPTYSIS: 0
SPUTUM PRODUCTION: 0
FEVER: 0
PALPITATIONS: 0

## 2025-06-30 ASSESSMENT — FIBROSIS 4 INDEX: FIB4 SCORE: 0.7

## 2025-06-30 NOTE — ASSESSMENT & PLAN NOTE
Chronic, severe, currently exacerbated. Patient is managed on Breztri + Duoneb + Albuterol prn and is oxygen dependent. Emphasized airway clearance w/ flutter valve and mucinex.  1 exacerbation since last appointment.  Patient feels much more controlled at today's appointment, mMRC improved from 4-2.  Will give emergency dose of prednisone as azithromycin due to patient's history of hospitalization.    Orders:    predniSONE (DELTASONE) 10 MG Tab; Take 30mg x 3 days, then take 20mg x 3 days, then take 10mg x 3 days, with food, then discontinue for COPD exacerbation only - emergency use    azithromycin (ZITHROMAX) 250 MG Tab; Take 2 tablets on day 1, then take 1 tablet a day for 4 days then discontinue for COPD exacerbation only - emergency use

## 2025-06-30 NOTE — LETTER
2025        Mila Acuña   1965      Mila was seen in clinic today 2025. Rena Alex NP is ok to manage the patient's insomnia. Mila is on a BiPAP for her sleep apnea and is well-optimized at this time. Patient or family should notify the sleep clinic with any changes in mentation or lethargy.                               SHIRA Clayton.

## 2025-06-30 NOTE — PROGRESS NOTES
Pulmonary Clinic follow up    Date of Service: 6/30/2025     Reason for follow up:  Follow-Up (Last seen 05/07/25/Dx: COPD with asthma, Chronic hypoxic respiratory failure/) and Results (CT CHEST 06/26/25/)    History of Present Illness:     Mila Acuña is a 60 y.o. female being evaluated in clinic today for f/u on COPD w/ asthma c/b chronic hypoxic respiratory failure. PMH includes ulcerative colitis, hx of bowel perforation, seasonal allergies. Patient is a 20 pack year smoker, quit in 2016. Patient was diagnosed with COPD approximately 2018. Patient was hospitalized in 2022 but has not required urgent care, prednisone, or emergency care since being started on Trelegy 200. CT chest from 8/2024 showed moderate emphysema and 7 mm RUL nodule. Nodule increased in size on a f/u CT from 11/2024 and we referred her to thoracic surgery. She saw DR Adan and PET was ordered and done in January 2025 which showed decrease in nodule size. CT chest follow up in April 2025 showed a resolution of the RUL nodule which favored an inflammatory process, some residual scarring noted. CT Chest on 6/26/2025 which showed stable right lower lobe nodule at 5x4 mm, stable since prior and benign. Patient presents after she was started on Bactrim DS for UC flare with prednisone on 4/1/2025.  Following that she was treated for COPD exacerbation with Levaquin.  She follows with a psychiatric nurse practitioner at [facility] [place], she is currently on Prozac and ropinirole.  Patient has been struggling with insomnia, despite optimal BiPAP therapy.    MMRC Grade:   0- Breathless only during strenuous exercise  1- Short of breath when hurrying or going up a small hill  2- Walks slower than friends due to breathlessness, has to stop at own pace  3- Stops to catch breath on level ground after 100m  4- Breathless with ambulating around house or ADLs     Review of Systems   Constitutional:  Negative for chills, fever and weight loss.    Respiratory:  Positive for cough and shortness of breath. Negative for hemoptysis, sputum production, wheezing and stridor.    Cardiovascular:  Negative for chest pain, palpitations and leg swelling.   Gastrointestinal:  Negative for heartburn.   Neurological:  Negative for dizziness.   Endo/Heme/Allergies:  Positive for environmental allergies.   Psychiatric/Behavioral:  Negative for depression.      Current Outpatient Medications on File Prior to Visit   Medication Sig Dispense Refill    Fezolinetant (VEOZAH PO)       Guaifenesin 1200 MG TABLET SR 12 HR Take 1 Tablet by mouth 2 times a day as needed (congestion). 28 Tablet 1    levoFLOXacin (LEVAQUIN) 750 MG tablet Take 1 Tablet by mouth every day. Take until gone. 7 Tablet 0    Budeson-Glycopyrrol-Formoterol (BREZTRI AEROSPHERE) 160-9-4.8 MCG/ACT Aerosol Inhale 2 Puffs 2 times a day. 10.7 g 11    ipratropium-albuterol (DUONEB) 0.5-2.5 (3) MG/3ML nebulizer solution Take 3 mL by nebulization every four hours as needed for Shortness of Breath. 120 mL 11    NEURONTIN 300 MG Cap       SKYRIZI 180 MG/1.2ML Solution Cartridge       VENTOLIN  (90 Base) MCG/ACT Aero Soln inhalation aerosol INHALE 1 PUFF BY MOUTH EVERY 4 HOURS AS NEEDED FOR WHEEZING 8.5 g 6    VEOZAH 45 MG Tab       ROPINIRole (REQUIP) 1 MG Tab Take 1 mg by mouth every evening.       No current facility-administered medications on file prior to visit.     Social History     Tobacco Use    Smoking status: Former     Current packs/day: 0.00     Average packs/day: 0.5 packs/day for 37.0 years (18.5 ttl pk-yrs)     Types: Cigarettes     Start date: 1979     Quit date: 2016     Years since quittin.5     Passive exposure: Past    Smokeless tobacco: Never   Vaping Use    Vaping status: Never Used   Substance Use Topics    Alcohol use: Not Currently     Alcohol/week: 3.6 oz     Types: 6 Standard drinks or equivalent per week     Comment: I quit drinking 2022    Drug use: Not  "Currently     Types: Methamphetamines      Past Medical History:   Diagnosis Date    Asthma     Back pain     Bronchitis     COPD (chronic obstructive pulmonary disease) (HCC)     Earache     Frequent urination     Pneumonia     Restless leg syndrome     Ringing in ears     Shortness of breath     Wears glasses     Wheezing      Past Surgical History:   Procedure Laterality Date    CHOLECYSTECTOMY      PRIMARY C SECTION       Aspirin, Doxycycline, and Duloxetine    Family History   Problem Relation Age of Onset    Asthma Mother     Cancer Mother     Asthma Father     Cancer Father     Asthma Maternal Uncle      /62 (BP Location: Right arm, Patient Position: Sitting, BP Cuff Size: Large adult)   Pulse 78   Ht 1.6 m (5' 3\")   Wt 113 kg (249 lb)   SpO2 95%      Physical Exam  Constitutional:       General: She is not in acute distress.  HENT:      Head: Normocephalic.      Nose: Nose normal.      Mouth/Throat:      Mouth: Mucous membranes are moist.   Eyes:      Conjunctiva/sclera: Conjunctivae normal.   Cardiovascular:      Rate and Rhythm: Normal rate and regular rhythm.      Heart sounds: No murmur heard.  Pulmonary:      Effort: Prolonged expiration present. No respiratory distress.      Breath sounds: Decreased breath sounds present. No wheezing or rhonchi.   Abdominal:      General: There is no distension.   Musculoskeletal:      Right lower leg: No edema.      Left lower leg: No edema.   Skin:     General: Skin is warm and dry.      Capillary Refill: Capillary refill takes less than 2 seconds.      Nails: There is no clubbing.   Neurological:      General: No focal deficit present.      Mental Status: She is alert and oriented to person, place, and time.   Psychiatric:         Mood and Affect: Mood normal.       Results:    PFT 9/18/2024:    Interpretation;   Baseline spirometry shows airflow obstruction with an FEV1/FVC ratio 64 and an FEV1 of 1.43 L or 57% predicted.  No significant bronchodilator " response.  Total lung capacity is normal however there is evidence for air trapping with residual volume of 168% predicted.  Diffusion capacity is low normal at 81% predicted.  Pulmonary function testing shows fixed airflow obstruction with mild air trapping and low normal DLCO consistent with chronic obstructive pulmonary disease.    CT chest 4/8/2025:    Right lung:  Previously seen posterior right upper lobe nodule measuring 10.57 mm has now almost completely resolved with just a slightly irregular curvilinear focus of scarring (axial image 68, series 4 from prior exam, compared with axial image 67, series 2 from today's exam). Findings consistent with resolving inflammatory process.     In the subpleural anterior right lower lobe at the lung bases, again seen is a tiny 5 mm stellate lung lesion which is unchanged from prior exam (axial image 111, series 2 from today's exam, axial image 116, series 2 from 11/12/2024).     Left lung:  Curvilinear stranding in the lingula consistent with scarring or subsegmental atelectatic change.     No dominant mass, moriah consolidation, or suspicious pulmonary nodule.     There is no effusion or pneumothorax.    Vaccinations:    Covid: refused by patient   Flu: due, encouraged to receive   Pneumonia: complete   RSV: due, encouraged to receive   Assessment & Plan  COPD with asthma (HCC)    Chronic, severe, currently exacerbated. Patient is managed on Breztri + Duoneb + Albuterol prn and is oxygen dependent. Emphasized airway clearance w/ flutter valve and mucinex.  1 exacerbation since last appointment.  Patient feels much more controlled at today's appointment, mMRC improved from 4-2.  Will give emergency dose of prednisone as azithromycin due to patient's history of hospitalization.    Orders:    predniSONE (DELTASONE) 10 MG Tab; Take 30mg x 3 days, then take 20mg x 3 days, then take 10mg x 3 days, with food, then discontinue for COPD exacerbation only - emergency use     azithromycin (ZITHROMAX) 250 MG Tab; Take 2 tablets on day 1, then take 1 tablet a day for 4 days then discontinue for COPD exacerbation only - emergency use     Chronic hypoxic respiratory failure (HCC)    Patient continues to use and benefit from supplemental oxygen       Lung nodule    CT chest from 8/2024 showed moderate emphysema and 7 mm RUL nodule. Nodule increased in size on a f/u CT from 11/2024 and we referred her to thoracic surgery. She saw Dr. Adan and PET was ordered and done in January 2025 which showed decrease in nodule size.     CT chest follow up in April 2025 showed a resolution of the RUL nodule which favored an inflammatory process, some residual scarring noted.     CT Chest on 6/26/2025 which showed stable right lower lobe nodule at 5x4 mm, stable since prior and benign.    Orders:    Referral to Pulmonary and Sleep Medicine      Return in about 6 months (around 12/30/2025), or if symptoms worsen or fail to improve, for f/u on PFT.     This note was generated using voice recognition software which has a chance of producing errors of grammar and possibly content.  I have made every reasonable attempt to find and correct any obvious errors, but it should be expected that some may not be found prior to finalization of this note.    Time spent in record review prior to patient arrival, reviewing results, and in face-to-face encounter totaled 33 min, excluding any procedures if performed.    Omid EMERSON  [facility]

## 2025-06-30 NOTE — ASSESSMENT & PLAN NOTE
CT chest from 8/2024 showed moderate emphysema and 7 mm RUL nodule. Nodule increased in size on a f/u CT from 11/2024 and we referred her to thoracic surgery. She saw Dr. Adan and PET was ordered and done in January 2025 which showed decrease in nodule size.     CT chest follow up in April 2025 showed a resolution of the RUL nodule which favored an inflammatory process, some residual scarring noted.     CT Chest on 6/26/2025 which showed stable right lower lobe nodule at 5x4 mm, stable since prior and benign.    Orders:    Referral to Pulmonary and Sleep Medicine

## 2025-07-03 NOTE — Clinical Note
REFERRAL APPROVAL NOTICE         Sent on July 3, 2025                   Mila Acuña  [address]  [place] [address]                   Dear Ms. Acuña,    After a careful review of the medical information and benefit coverage, [facility] has processed your referral. See below for additional details.    If applicable, you must be actively enrolled with your insurance for coverage of the authorized service. If you have any questions regarding your coverage, please contact your insurance directly.    REFERRAL INFORMATION   Referral #:  [id number]  Referred-To Department    Referred-By Provider:  Pulmonary and Sleep Medicine    NERY Clayton   Pulmonary/sleep [facility]      [address]  [address]  [place] [address]  [phone] [address], [address]  [place] [id number] [phone]    Referral Start Date:  06/30/2025  Referral End Date:   06/30/2026           SCHEDULING  If you do not already have an appointment, please call [phone] to make an appointment.   MORE INFORMATION  As a reminder, [facility] ownership has changed, meaning this location is now owned and operated by [facility]. As such, we want to clarify that our patients should expect to receive two separate bills for the services received at [facility] - one representing the [facility] facility fees as the owner of the establishment, and the other to represent the physician's services and subsequent fees. You can speak with your insurance carrier for a pricing estimate by calling the customer service number on the back of your card and ask about charges for a hospital outpatient visit.  If you do not already have a [facility] account, sign up at: [email or web address]  You can access your medical information, make appointments, see lab results, billing  information, and more.  If you have questions regarding this referral, please contact  the [facility] department at:             [phone]. Monday - Friday 7:30AM - 5:00PM.      Sincerely,  [facility]

## 2025-07-16 DIAGNOSIS — J44.89 COPD WITH ASTHMA (HCC): ICD-10-CM

## 2025-07-16 RX ORDER — IPRATROPIUM BROMIDE AND ALBUTEROL SULFATE 2.5; .5 MG/3ML; MG/3ML
SOLUTION RESPIRATORY (INHALATION)
Qty: 180 ML | Refills: 11 | Status: SHIPPED | OUTPATIENT
Start: 2025-07-16

## 2025-07-16 NOTE — TELEPHONE ENCOUNTER
Caller Name: Mila Acuña                 Call Back Number: There are no phone numbers on file.        Patient approves a detailed voicemail message:     Have we ever prescribed this med? .  If yes, what date?     Last OV: 6/30/25 W/ Omid Soni     Next OV: 12/9/25 w/ Gay May     DX:     Medications:  IPRATROPIUM BROMIDE/ALBUTEROL SULFATE ALBUTER SOLUTION

## 2025-07-28 ENCOUNTER — OFFICE VISIT (OUTPATIENT)
Dept: SLEEP MEDICINE | Facility: MEDICAL CENTER | Age: 60
End: 2025-07-28
Payer: COMMERCIAL

## 2025-07-28 VITALS
HEIGHT: 63 IN | SYSTOLIC BLOOD PRESSURE: 110 MMHG | DIASTOLIC BLOOD PRESSURE: 72 MMHG | BODY MASS INDEX: 43.09 KG/M2 | OXYGEN SATURATION: 95 % | WEIGHT: 243.2 LBS | HEART RATE: 74 BPM | RESPIRATION RATE: 16 BRPM

## 2025-07-28 DIAGNOSIS — R91.1 LUNG NODULE: Primary | ICD-10-CM

## 2025-07-28 PROCEDURE — 99213 OFFICE O/P EST LOW 20 MIN: CPT | Performed by: FAMILY MEDICINE

## 2025-07-28 PROCEDURE — 3078F DIAST BP <80 MM HG: CPT | Performed by: FAMILY MEDICINE

## 2025-07-28 PROCEDURE — 3074F SYST BP LT 130 MM HG: CPT | Performed by: FAMILY MEDICINE

## 2025-07-28 RX ORDER — DICYCLOMINE HCL 20 MG
TABLET ORAL
COMMUNITY
Start: 2025-07-24

## 2025-07-28 RX ORDER — OMEPRAZOLE 40 MG/1
CAPSULE, DELAYED RELEASE ORAL
COMMUNITY
Start: 2025-07-01

## 2025-07-28 RX ORDER — ZOLPIDEM TARTRATE 10 MG/1
TABLET, FILM COATED ORAL
COMMUNITY
Start: 2025-07-21

## 2025-07-28 ASSESSMENT — FIBROSIS 4 INDEX: FIB4 SCORE: 0.7

## 2025-07-28 NOTE — PROGRESS NOTES
Subjective:     CC:  The encounter diagnosis was Lung nodule.    HISTORY OF THE PRESENT ILLNESS: Patient is a 60 y.o. female. This pleasant patient is here today to establish care in the [facility] Nodule Clinic and discuss lung nodule. Her referring provider is Omid EMERSON.  She does not have a PCP.    Pulmonary nodule- Mila is a 60F former smoker with a 18.5 pack year history (quit smoking 2016) who has JOSÉ MIGUEL, COPD with asthma, chronic hypoxic respiratory failure who is presenting for evaluation of a lung nodule.  She was noted to have a 7mm RUL lung nodule on CT chest 8/2024.  The nodule increase din size on her follow-up CT 11/2024 and she was referred to thoracic surgery.  She was evaluated by Dr. Adan and a PET was obtained 1/2025 and showed the nodule had decreased in size.  Her follow-up CT chest 4/2025 showed resolution of the RUL nodule.  She also has had a 5 x 4mm RLL nodule which has been stable from 8/20/24 - 6/26/25.    She vaped for 2 years.  Her father had lung cancer and so did his siblings.  Her mother had lung cancer and an abnormality around her heart of unclear cause.  All family members who had lung cancer were tobacco smokers.  Her maternal grandfather and grandmother had emphysema.  She has had significant second hand tobacco smoke exposure.  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  [facility] and then moved to [place] where she lives now.  She used to clean often with CLR mister clean, lysol, beach, pinesol, ammonia fumes.  She has used paint thinner often.  No other known exposure to dust, fumes, solvents, gases, tuberculosis, radon, chemicals, radiation, or asbestos.      She uses supplemental oxygen only with walking and at night (3L) and not at rest.  She has not needed more oxygen than normally recently.  She has history of asthma dx in her 20s which worsened in [place] compared to [place].  She has hx of  methamphetamine use more than 10 years ago.  She was dx with COPD around 2017 for which she is seeing general pulmonary.  She has beeh hospitalized for acute hypoxic respiratory failure but has not needed intubated.  She has no PNA possibly once around [phone] but did not need hospitalized for the PNA.    She has no acute symptoms today.      Allergies: Aspirin, Doxycycline, and Duloxetine    Current Medications and Prescriptions Ordered in Epic[1]    Past Medical History[2]    Past Surgical History[3]    Social History[4]    Social History     Social History Narrative    Not on file       Family History   Problem Relation Age of Onset    Asthma Mother     Cancer Mother     Asthma Father     Cancer Father     Asthma Maternal Uncle     Cancer Paternal Aunt     Cancer Paternal Uncle     Lung Disease Maternal Grandmother         emphysema    Lung Disease Maternal Grandfather         emphysema       Health Maintenance: She has a PCP appointment scheduled for 9/2025.  She reports her most recent colonoscopy was 4/28/25 ([facility]) and she has regular GI follow-up with [facility] appt [phone].  Recommended routing pap smears.  She reports last mammogram was 10/24 (normal) and she plans to get another mammogram 10/25.      ROS:   Gen: no fevers/chills, no changes in weight  ENT: no bloody nose, +post nasal drainage  Pulm: +chronic, intermittent SOB without recent worsening.  +cough yesterday 3-4 times and several times in the car, but no cough since coming to clinic this afternoon.  Cough is dry with rare light yellow sputum.  No hemoptysis.  CV: no chest pain, no palpitations  GI: +easily nauseous but no vomiting. +intermittent diarrhea with her ulcerative colitis  : no dysuria  MSk: +chronic muscle soreness for years without recent worsening  Skin: no rash  Neuro: +chronic headaches for years without recent worsening  Heme/Lymph: +rectal bleeding (hx of ulcerative colitis and she is followed by GI)      Objective:  "    Exam: /72 (BP Location: Right arm, Patient Position: Sitting, BP Cuff Size: Large adult)   Pulse 74   Resp 16   Ht 1.6 m (5' 3\")   Wt 110 kg (243 lb 3.2 oz)   SpO2 95%  Body mass index is 43.08 kg/m².    General: Normal appearing. No distress.  HEENT: Normocephalic.    Eyes: Eyes conjunctiva clear lids without ptosis.  +wearing sunglasses throughout the encounter due to them being prescription  Neck: Supple. Thyroid is not enlarged.  Pulmonary: Clear to ausculation.  Normal effort. No rales, ronchi, or wheezing.  Cardiovascular: Regular rate and rhythm without murmur.   Abdomen: Soft, nontender, nondistended. Normal bowel sounds.   Neurologic: No gross motor deficits  Lymph: No cervical or supraclavicular lymph nodes are palpable  Skin: Warm and dry.  No obvious lesions.  Musculoskeletal: Normal gait.   Psych: Normal mood and affect. Alert and oriented. Judgment and insight is normal.      Assessment & Plan:   60 y.o. female with the following -    1. Lung nodule (Primary)  Newly presenting, no acute or worsening symptoms.  VS stable and PE normal despite the cough on the way to clinic today.  [place] has a 7mm RUL nodule that had grown and then nearly resolved on CT chest 6/26/25. She has had a stable 5 x 4mm right hemidiaphragm nodule from 8/20/24-6/26/25.  We reviewed her Cts from 8/20/24, 11/12/24, 1/10/25, 4/8/25 and 6/26/25 together today. We discussed that lung nodules can be due to inflammation, scarring, infection, tumor/malignancy.   Her 7mm RUL nodule has been behaving benign recently with the reduction in size and it was not PET avid 1/10/25.  The 5 x 4mm righ themidiaphgram nodule is behaving benign given its stability from 8/20/24-6/26/25.  Given her smoking history, recommended continued serial monitoring with a followup CT chest in 1 year based on the Fleischner Society 2017 recommendations. [place] Solitary Nodule Risk Score = 1.3%.  Encouraged continued avoidance of tobacco products and " lung irritants.  Encouraged her to be UTD on pap smears, colonoscopies and mammograms.  Follow-up and red flag precautions given. Patient expressed understanding and agreement with plan.  - CT-CHEST (THORAX) W/O; Future    Return in about 1 year (around 7/28/2026) for follow-up lung nodule after CT obtained.    Please note that this dictation was created using voice recognition software. I have made every reasonable attempt to correct obvious errors, but I expect that there are errors of grammar and possibly content that I did not discover before finalizing the note.         [1]   Current Outpatient Medications Ordered in Epic   Medication Sig Dispense Refill    omeprazole (PRILOSEC) 40 MG delayed-release capsule       dicyclomine (BENTYL) 20 MG Tab       AMBIEN 10 MG Tab       Guselkumab (TREMFYA IV) Infuse 1 Units into a venous catheter every 30 (thirty) days. She is unsure of the dose she is receiving      ipratropium-albuterol (DUONEB) 0.5-2.5 (3) MG/3ML nebulizer solution USE 1 VIAL VIA NEBULIZER EVERY FOUR HOURS AS NEEDED FOR SHORTNESS OF BREATH. 180 mL 11    Budeson-Glycopyrrol-Formoterol (BREZTRI AEROSPHERE) 160-9-4.8 MCG/ACT Aerosol Inhale 2 Puffs 2 times a day. 10.7 g 11    VENTOLIN  (90 Base) MCG/ACT Aero Soln inhalation aerosol INHALE 1 PUFF BY MOUTH EVERY 4 HOURS AS NEEDED FOR WHEEZING 8.5 g 6    VEOZAH 45 MG Tab       ROPINIRole (REQUIP) 1 MG Tab Take 1 mg by mouth every evening.      predniSONE (DELTASONE) 10 MG Tab Take 30mg x 3 days, then take 20mg x 3 days, then take 10mg x 3 days, with food, then discontinue for COPD exacerbation only - emergency use 18 Tablet 1    azithromycin (ZITHROMAX) 250 MG Tab Take 2 tablets on day 1, then take 1 tablet a day for 4 days then discontinue for COPD exacerbation only - emergency use (Patient not taking: Reported on 7/28/2025) 6 Tablet 1    Guaifenesin 1200 MG TABLET SR 12 HR Take 1 Tablet by mouth 2 times a day as needed (congestion). (Patient not  taking: Reported on 2025) 28 Tablet 1     No current Epic-ordered facility-administered medications on file.   [2]   Past Medical History:  Diagnosis Date    Asthma     Back pain     Bronchitis     COPD (chronic obstructive pulmonary disease) (HCC)     Earache     Frequent urination     Pneumonia     Restless leg syndrome     Ringing in ears     Shortness of breath     Wears glasses     Wheezing    [3]   Past Surgical History:  Procedure Laterality Date    CHOLECYSTECTOMY      OTHER        (3 in total)    PRIMARY C SECTION     [4]   Social History  Tobacco Use    Smoking status: Former     Current packs/day: 0.00     Average packs/day: 0.5 packs/day for 37.0 years (18.5 ttl pk-yrs)     Types: Cigarettes     Start date: 1979     Quit date: 2016     Years since quittin.5     Passive exposure: Past    Smokeless tobacco: Never   Vaping Use    Vaping status: Former    Quit date: 2015   Substance Use Topics    Alcohol use: Not Currently     Alcohol/week: 3.6 oz     Types: 6 Standard drinks or equivalent per week     Comment: I quit drinking 2022    Drug use: Not Currently     Types: Methamphetamines

## 2025-08-20 ENCOUNTER — APPOINTMENT (OUTPATIENT)
Dept: RADIOLOGY | Facility: MEDICAL CENTER | Age: 60
End: 2025-08-20
Attending: NURSE PRACTITIONER
Payer: COMMERCIAL